# Patient Record
Sex: MALE | Race: BLACK OR AFRICAN AMERICAN | Employment: OTHER | URBAN - METROPOLITAN AREA
[De-identification: names, ages, dates, MRNs, and addresses within clinical notes are randomized per-mention and may not be internally consistent; named-entity substitution may affect disease eponyms.]

---

## 2017-04-01 DIAGNOSIS — I10 ESSENTIAL HYPERTENSION: ICD-10-CM

## 2017-04-03 RX ORDER — VALSARTAN AND HYDROCHLOROTHIAZIDE 160; 12.5 MG/1; MG/1
TABLET, FILM COATED ORAL
Qty: 90 TABLET | Refills: 1 | Status: SHIPPED | OUTPATIENT
Start: 2017-04-03 | End: 2017-08-18 | Stop reason: SDUPTHER

## 2017-05-03 ENCOUNTER — HOSPITAL ENCOUNTER (OUTPATIENT)
Age: 67
Setting detail: SPECIMEN
Discharge: HOME | End: 2017-05-03
Payer: MEDICARE

## 2017-05-03 ENCOUNTER — OFFICE VISIT (OUTPATIENT)
Dept: INTERNAL MEDICINE CLINIC | Age: 67
End: 2017-05-03
Payer: MEDICARE

## 2017-05-03 VITALS
OXYGEN SATURATION: 95 % | HEIGHT: 68 IN | HEART RATE: 85 BPM | TEMPERATURE: 98.4 F | BODY MASS INDEX: 24.55 KG/M2 | RESPIRATION RATE: 16 BRPM | SYSTOLIC BLOOD PRESSURE: 130 MMHG | DIASTOLIC BLOOD PRESSURE: 72 MMHG | WEIGHT: 162 LBS

## 2017-05-03 DIAGNOSIS — Z13.9 SCREENING: ICD-10-CM

## 2017-05-03 DIAGNOSIS — R73.03 PREDIABETES: ICD-10-CM

## 2017-05-03 DIAGNOSIS — N40.0 BENIGN NON-NODULAR PROSTATIC HYPERPLASIA WITHOUT LOWER URINARY TRACT SYMPTOMS: ICD-10-CM

## 2017-05-03 DIAGNOSIS — N52.01 ERECTILE DYSFUNCTION DUE TO ARTERIAL INSUFFICIENCY: ICD-10-CM

## 2017-05-03 DIAGNOSIS — R97.20 ELEVATED PSA: ICD-10-CM

## 2017-05-03 DIAGNOSIS — I10 ESSENTIAL HYPERTENSION: Primary | ICD-10-CM

## 2017-05-03 DIAGNOSIS — I10 ESSENTIAL HYPERTENSION: ICD-10-CM

## 2017-05-03 LAB
ALBUMIN SERPL-MCNC: 4.6 G/DL (ref 3.5–5.2)
ALBUMIN/GLOBULIN RATIO: 1.2 (ref 1–2.5)
ALP BLD-CCNC: 84 U/L (ref 40–129)
ALT SERPL-CCNC: 23 U/L (ref 5–41)
ANION GAP SERPL CALCULATED.3IONS-SCNC: 18 MMOL/L (ref 9–17)
AST SERPL-CCNC: 22 U/L
BILIRUB SERPL-MCNC: 0.24 MG/DL (ref 0.3–1.2)
BUN BLDV-MCNC: 16 MG/DL (ref 8–23)
BUN/CREAT BLD: ABNORMAL (ref 9–20)
CALCIUM SERPL-MCNC: 10.2 MG/DL (ref 8.6–10.4)
CHLORIDE BLD-SCNC: 97 MMOL/L (ref 98–107)
CHOLESTEROL/HDL RATIO: 4.1
CHOLESTEROL: 171 MG/DL
CO2: 22 MMOL/L (ref 20–31)
CREAT SERPL-MCNC: 0.9 MG/DL (ref 0.7–1.2)
GFR AFRICAN AMERICAN: >60 ML/MIN
GFR NON-AFRICAN AMERICAN: >60 ML/MIN
GFR SERPL CREATININE-BSD FRML MDRD: ABNORMAL ML/MIN/{1.73_M2}
GFR SERPL CREATININE-BSD FRML MDRD: ABNORMAL ML/MIN/{1.73_M2}
GLUCOSE BLD-MCNC: 63 MG/DL (ref 70–99)
HDLC SERPL-MCNC: 42 MG/DL
HEPATITIS C ANTIBODY: NONREACTIVE
LDL CHOLESTEROL: 100 MG/DL (ref 0–130)
POTASSIUM SERPL-SCNC: 4.7 MMOL/L (ref 3.7–5.3)
SODIUM BLD-SCNC: 137 MMOL/L (ref 135–144)
TOTAL PROTEIN: 8.4 G/DL (ref 6.4–8.3)
TRIGL SERPL-MCNC: 145 MG/DL
VLDLC SERPL CALC-MCNC: NORMAL MG/DL (ref 1–30)

## 2017-05-03 PROCEDURE — 99213 OFFICE O/P EST LOW 20 MIN: CPT | Performed by: FAMILY MEDICINE

## 2017-05-03 RX ORDER — SILDENAFIL 100 MG/1
100 TABLET, FILM COATED ORAL PRN
Qty: 30 TABLET | Refills: 3 | Status: SHIPPED | OUTPATIENT
Start: 2017-05-03 | End: 2017-05-11 | Stop reason: SDUPTHER

## 2017-05-03 ASSESSMENT — ENCOUNTER SYMPTOMS
WHEEZING: 0
ABDOMINAL PAIN: 0
FACIAL SWELLING: 0
ABDOMINAL DISTENTION: 0
TROUBLE SWALLOWING: 0
CONSTIPATION: 0
SORE THROAT: 0
ANAL BLEEDING: 0
CHEST TIGHTNESS: 0
EYE PAIN: 0
STRIDOR: 0
COUGH: 0
COLOR CHANGE: 0
EYE DISCHARGE: 0
BACK PAIN: 0
SHORTNESS OF BREATH: 0
EYE REDNESS: 0

## 2017-05-04 LAB
ESTIMATED AVERAGE GLUCOSE: 128 MG/DL
HBA1C MFR BLD: 6.1 % (ref 4–6)

## 2017-05-10 ENCOUNTER — TELEPHONE (OUTPATIENT)
Dept: INTERNAL MEDICINE CLINIC | Age: 67
End: 2017-05-10

## 2017-05-11 DIAGNOSIS — N52.01 ERECTILE DYSFUNCTION DUE TO ARTERIAL INSUFFICIENCY: ICD-10-CM

## 2017-05-11 RX ORDER — SILDENAFIL 100 MG/1
TABLET, FILM COATED ORAL
Qty: 30 TABLET | Refills: 3 | Status: SHIPPED | OUTPATIENT
Start: 2017-05-11 | End: 2017-05-20 | Stop reason: SDUPTHER

## 2017-05-20 DIAGNOSIS — N52.01 ERECTILE DYSFUNCTION DUE TO ARTERIAL INSUFFICIENCY: ICD-10-CM

## 2017-05-20 RX ORDER — SILDENAFIL 100 MG/1
TABLET, FILM COATED ORAL
Qty: 30 TABLET | Refills: 3 | Status: SHIPPED | OUTPATIENT
Start: 2017-05-20

## 2017-08-18 DIAGNOSIS — I10 ESSENTIAL HYPERTENSION: ICD-10-CM

## 2017-08-18 RX ORDER — VALSARTAN AND HYDROCHLOROTHIAZIDE 160; 12.5 MG/1; MG/1
TABLET, FILM COATED ORAL
Qty: 90 TABLET | Refills: 1 | Status: SHIPPED | OUTPATIENT
Start: 2017-08-18 | End: 2017-12-01 | Stop reason: SDUPTHER

## 2017-08-30 DIAGNOSIS — I10 ESSENTIAL HYPERTENSION: ICD-10-CM

## 2017-08-30 RX ORDER — POTASSIUM CHLORIDE 750 MG/1
TABLET, FILM COATED, EXTENDED RELEASE ORAL
Qty: 180 TABLET | Refills: 0 | Status: SHIPPED | OUTPATIENT
Start: 2017-08-30 | End: 2017-12-01 | Stop reason: ALTCHOICE

## 2017-11-02 RX ORDER — POTASSIUM CHLORIDE 750 MG/1
TABLET, EXTENDED RELEASE ORAL
Qty: 30 TABLET | Refills: 0 | OUTPATIENT
Start: 2017-11-02

## 2017-12-01 ENCOUNTER — OFFICE VISIT (OUTPATIENT)
Dept: INTERNAL MEDICINE CLINIC | Age: 67
End: 2017-12-01
Payer: MEDICARE

## 2017-12-01 VITALS
WEIGHT: 161 LBS | DIASTOLIC BLOOD PRESSURE: 80 MMHG | HEIGHT: 68 IN | HEART RATE: 82 BPM | SYSTOLIC BLOOD PRESSURE: 130 MMHG | BODY MASS INDEX: 24.4 KG/M2

## 2017-12-01 DIAGNOSIS — R73.03 PREDIABETES: ICD-10-CM

## 2017-12-01 DIAGNOSIS — Z87.898 HISTORY OF ELEVATED PSA: ICD-10-CM

## 2017-12-01 DIAGNOSIS — N52.01 ERECTILE DYSFUNCTION DUE TO ARTERIAL INSUFFICIENCY: ICD-10-CM

## 2017-12-01 DIAGNOSIS — N40.0 BENIGN NON-NODULAR PROSTATIC HYPERPLASIA WITHOUT LOWER URINARY TRACT SYMPTOMS: ICD-10-CM

## 2017-12-01 DIAGNOSIS — I10 ESSENTIAL HYPERTENSION: Primary | ICD-10-CM

## 2017-12-01 PROCEDURE — 3017F COLORECTAL CA SCREEN DOC REV: CPT | Performed by: FAMILY MEDICINE

## 2017-12-01 PROCEDURE — G8427 DOCREV CUR MEDS BY ELIG CLIN: HCPCS | Performed by: FAMILY MEDICINE

## 2017-12-01 PROCEDURE — G8420 CALC BMI NORM PARAMETERS: HCPCS | Performed by: FAMILY MEDICINE

## 2017-12-01 PROCEDURE — 4040F PNEUMOC VAC/ADMIN/RCVD: CPT | Performed by: FAMILY MEDICINE

## 2017-12-01 PROCEDURE — 1123F ACP DISCUSS/DSCN MKR DOCD: CPT | Performed by: FAMILY MEDICINE

## 2017-12-01 PROCEDURE — G8484 FLU IMMUNIZE NO ADMIN: HCPCS | Performed by: FAMILY MEDICINE

## 2017-12-01 PROCEDURE — 99214 OFFICE O/P EST MOD 30 MIN: CPT | Performed by: FAMILY MEDICINE

## 2017-12-01 PROCEDURE — 1036F TOBACCO NON-USER: CPT | Performed by: FAMILY MEDICINE

## 2017-12-01 RX ORDER — VALSARTAN AND HYDROCHLOROTHIAZIDE 160; 12.5 MG/1; MG/1
1 TABLET, FILM COATED ORAL DAILY
Qty: 90 TABLET | Refills: 1 | Status: SHIPPED | OUTPATIENT
Start: 2017-12-01 | End: 2018-05-21 | Stop reason: SDUPTHER

## 2017-12-01 RX ORDER — POTASSIUM CHLORIDE 750 MG/1
10 TABLET, FILM COATED, EXTENDED RELEASE ORAL DAILY
Qty: 180 TABLET | Refills: 0 | Status: CANCELLED | OUTPATIENT
Start: 2017-12-01

## 2017-12-01 ASSESSMENT — PATIENT HEALTH QUESTIONNAIRE - PHQ9
SUM OF ALL RESPONSES TO PHQ9 QUESTIONS 1 & 2: 0
1. LITTLE INTEREST OR PLEASURE IN DOING THINGS: 0
2. FEELING DOWN, DEPRESSED OR HOPELESS: 0
SUM OF ALL RESPONSES TO PHQ QUESTIONS 1-9: 0

## 2017-12-01 ASSESSMENT — ENCOUNTER SYMPTOMS
EYES NEGATIVE: 1
RESPIRATORY NEGATIVE: 1
ALLERGIC/IMMUNOLOGIC NEGATIVE: 1
GASTROINTESTINAL NEGATIVE: 1

## 2017-12-01 NOTE — PROGRESS NOTES
Subjective:      Patient ID: Susie Butts is a 69 y.o. male. Hypertension   This is a chronic problem. The problem has been gradually improving since onset. The problem is controlled. Risk factors for coronary artery disease include male gender. Past treatments include angiotensin blockers and diuretics. The current treatment provides moderate improvement. There are no compliance problems. Review of Systems   Constitutional: Negative. HENT: Negative. Eyes: Negative. Respiratory: Negative. Cardiovascular: Negative. Gastrointestinal: Negative. Endocrine: Negative. Musculoskeletal: Negative. Skin: Negative. Allergic/Immunologic: Negative. Neurological: Negative. Hematological: Negative. Psychiatric/Behavioral: Negative. Past family and social history unremarkable. Objective:   Physical Exam   Constitutional: He is oriented to person, place, and time. He appears well-developed and well-nourished. HENT:   Head: Normocephalic and atraumatic. Right Ear: External ear normal.   Left Ear: External ear normal.   Nose: Nose normal.   Mouth/Throat: Oropharynx is clear and moist.   Eyes: Conjunctivae and EOM are normal. Pupils are equal, round, and reactive to light. Neck: Normal range of motion. Neck supple. Cardiovascular: Normal rate, regular rhythm, normal heart sounds and intact distal pulses. Pulmonary/Chest: Effort normal and breath sounds normal.   Abdominal: Soft. Bowel sounds are normal.   Musculoskeletal: Normal range of motion. Neurological: He is alert and oriented to person, place, and time. He has normal reflexes. Skin: Skin is warm and dry. Psychiatric: He has a normal mood and affect. His behavior is normal. Thought content normal.       Assessment:      1. Essential hypertension  valsartan-hydrochlorothiazide (DIOVAN-HCT) 160-12.5 MG per tablet   2. Prediabetes     3. Erectile dysfunction due to arterial insufficiency     4.  Benign non-nodular prostatic hyperplasia without lower urinary tract symptoms     5. History of elevated PSA             Plan:      77-year-old male who is presented for follow-up. He is afebrile hemodynamically stable, clinical examination is benign. History of hypertension well-controlled to ARB and diuretic that he is tolerating well. Advised to continue current regimen, low-fat high-fiber diet, consume less than 2 g of salt a day, daily moderate exercise  Prediabetes with A1c of 6.1. Risk factor stratification is advised. History of BPH. clinically asymptomatic  He is updated on colonoscopy he is updated on colonoscopy done in 2016. Past  history of elevated PSA for which she has been evaluated by urology. Erectile dysfunction baseline stable. He denies tobacco, excessive alcohol or illicit drug use  Recent past labs reviewed, discussed with patient, question answered    This note is created with a voice recognition program and while intend to generate a document that accurately reflects the content of the visit, no guarantee can be provided that every mistake has been identified and corrected by editing.

## 2018-06-01 ENCOUNTER — HOSPITAL ENCOUNTER (OUTPATIENT)
Age: 68
Setting detail: SPECIMEN
Discharge: HOME | End: 2018-06-01
Payer: MEDICARE

## 2018-06-01 ENCOUNTER — OFFICE VISIT (OUTPATIENT)
Dept: INTERNAL MEDICINE CLINIC | Age: 68
End: 2018-06-01
Payer: MEDICARE

## 2018-06-01 VITALS
HEIGHT: 68 IN | SYSTOLIC BLOOD PRESSURE: 118 MMHG | WEIGHT: 161.2 LBS | BODY MASS INDEX: 24.43 KG/M2 | DIASTOLIC BLOOD PRESSURE: 78 MMHG

## 2018-06-01 DIAGNOSIS — I10 ESSENTIAL HYPERTENSION: ICD-10-CM

## 2018-06-01 DIAGNOSIS — D12.6 ADENOMATOUS POLYP OF COLON, UNSPECIFIED PART OF COLON: ICD-10-CM

## 2018-06-01 DIAGNOSIS — Z87.898 HISTORY OF ELEVATED PSA: ICD-10-CM

## 2018-06-01 DIAGNOSIS — R73.03 PREDIABETES: ICD-10-CM

## 2018-06-01 DIAGNOSIS — N40.0 BENIGN NON-NODULAR PROSTATIC HYPERPLASIA WITHOUT LOWER URINARY TRACT SYMPTOMS: ICD-10-CM

## 2018-06-01 DIAGNOSIS — N52.01 ERECTILE DYSFUNCTION DUE TO ARTERIAL INSUFFICIENCY: ICD-10-CM

## 2018-06-01 DIAGNOSIS — R73.03 PREDIABETES: Primary | ICD-10-CM

## 2018-06-01 LAB
ABSOLUTE EOS #: 0.13 K/UL (ref 0–0.44)
ABSOLUTE IMMATURE GRANULOCYTE: <0.03 K/UL (ref 0–0.3)
ABSOLUTE LYMPH #: 1.62 K/UL (ref 1.1–3.7)
ABSOLUTE MONO #: 0.45 K/UL (ref 0.1–1.2)
ALBUMIN SERPL-MCNC: 4.1 G/DL (ref 3.5–5.2)
ALBUMIN/GLOBULIN RATIO: 1.2 (ref 1–2.5)
ALP BLD-CCNC: 77 U/L (ref 40–129)
ALT SERPL-CCNC: 36 U/L (ref 5–41)
ANION GAP SERPL CALCULATED.3IONS-SCNC: 16 MMOL/L (ref 9–17)
AST SERPL-CCNC: 25 U/L
BASOPHILS # BLD: 0 % (ref 0–2)
BASOPHILS ABSOLUTE: <0.03 K/UL (ref 0–0.2)
BILIRUB SERPL-MCNC: 0.35 MG/DL (ref 0.3–1.2)
BILIRUBIN URINE: NEGATIVE
BUN BLDV-MCNC: 16 MG/DL (ref 8–23)
BUN/CREAT BLD: NORMAL (ref 9–20)
CALCIUM SERPL-MCNC: 8.9 MG/DL (ref 8.6–10.4)
CHLORIDE BLD-SCNC: 99 MMOL/L (ref 98–107)
CHOLESTEROL/HDL RATIO: 5.1
CHOLESTEROL: 157 MG/DL
CO2: 20 MMOL/L (ref 20–31)
COLOR: YELLOW
COMMENT UA: NORMAL
CREAT SERPL-MCNC: 1.04 MG/DL (ref 0.7–1.2)
CREATININE URINE: 105 MG/DL (ref 39–259)
DIFFERENTIAL TYPE: ABNORMAL
EOSINOPHILS RELATIVE PERCENT: 3 % (ref 1–4)
GFR AFRICAN AMERICAN: >60 ML/MIN
GFR NON-AFRICAN AMERICAN: >60 ML/MIN
GFR SERPL CREATININE-BSD FRML MDRD: NORMAL ML/MIN/{1.73_M2}
GFR SERPL CREATININE-BSD FRML MDRD: NORMAL ML/MIN/{1.73_M2}
GLUCOSE BLD-MCNC: 96 MG/DL (ref 70–99)
GLUCOSE URINE: NEGATIVE
HCT VFR BLD CALC: 48.5 % (ref 40.7–50.3)
HDLC SERPL-MCNC: 31 MG/DL
HEMOGLOBIN: 15.7 G/DL (ref 13–17)
HEPATITIS C ANTIBODY: NONREACTIVE
HIV AG/AB: NONREACTIVE
IMMATURE GRANULOCYTES: 1 %
KETONES, URINE: NEGATIVE
LDL CHOLESTEROL: 87 MG/DL (ref 0–130)
LEUKOCYTE ESTERASE, URINE: NEGATIVE
LYMPHOCYTES # BLD: 42 % (ref 24–43)
MCH RBC QN AUTO: 28.6 PG (ref 25.2–33.5)
MCHC RBC AUTO-ENTMCNC: 32.4 G/DL (ref 28.4–34.8)
MCV RBC AUTO: 88.3 FL (ref 82.6–102.9)
MICROALBUMIN/CREAT 24H UR: <12 MG/L
MICROALBUMIN/CREAT UR-RTO: NORMAL MCG/MG CREAT
MONOCYTES # BLD: 12 % (ref 3–12)
NITRITE, URINE: NEGATIVE
NRBC AUTOMATED: 0 PER 100 WBC
PDW BLD-RTO: 14.8 % (ref 11.8–14.4)
PH UA: 5 (ref 5–8)
PLATELET # BLD: 166 K/UL (ref 138–453)
PLATELET ESTIMATE: ABNORMAL
PMV BLD AUTO: 11.6 FL (ref 8.1–13.5)
POTASSIUM SERPL-SCNC: 4 MMOL/L (ref 3.7–5.3)
PROSTATE SPECIFIC ANTIGEN: 7.4 UG/L
PROTEIN UA: NEGATIVE
RBC # BLD: 5.49 M/UL (ref 4.21–5.77)
RBC # BLD: ABNORMAL 10*6/UL
SEG NEUTROPHILS: 42 % (ref 36–65)
SEGMENTED NEUTROPHILS ABSOLUTE COUNT: 1.62 K/UL (ref 1.5–8.1)
SODIUM BLD-SCNC: 135 MMOL/L (ref 135–144)
SPECIFIC GRAVITY UA: 1.02 (ref 1–1.03)
TOTAL PROTEIN: 7.5 G/DL (ref 6.4–8.3)
TRIGL SERPL-MCNC: 195 MG/DL
TSH SERPL DL<=0.05 MIU/L-ACNC: 2.01 MIU/L (ref 0.3–5)
TURBIDITY: CLEAR
URINE HGB: NEGATIVE
UROBILINOGEN, URINE: NORMAL
VLDLC SERPL CALC-MCNC: ABNORMAL MG/DL (ref 1–30)
WBC # BLD: 3.9 K/UL (ref 3.5–11.3)
WBC # BLD: ABNORMAL 10*3/UL

## 2018-06-01 PROCEDURE — 99214 OFFICE O/P EST MOD 30 MIN: CPT | Performed by: FAMILY MEDICINE

## 2018-06-01 PROCEDURE — 4040F PNEUMOC VAC/ADMIN/RCVD: CPT | Performed by: FAMILY MEDICINE

## 2018-06-01 PROCEDURE — 3017F COLORECTAL CA SCREEN DOC REV: CPT | Performed by: FAMILY MEDICINE

## 2018-06-01 PROCEDURE — G8420 CALC BMI NORM PARAMETERS: HCPCS | Performed by: FAMILY MEDICINE

## 2018-06-01 PROCEDURE — G8427 DOCREV CUR MEDS BY ELIG CLIN: HCPCS | Performed by: FAMILY MEDICINE

## 2018-06-01 PROCEDURE — 1123F ACP DISCUSS/DSCN MKR DOCD: CPT | Performed by: FAMILY MEDICINE

## 2018-06-01 PROCEDURE — 1036F TOBACCO NON-USER: CPT | Performed by: FAMILY MEDICINE

## 2018-06-01 RX ORDER — VALSARTAN AND HYDROCHLOROTHIAZIDE 160; 12.5 MG/1; MG/1
1 TABLET, FILM COATED ORAL DAILY
Qty: 90 TABLET | Refills: 1 | Status: SHIPPED | OUTPATIENT
Start: 2018-06-01 | End: 2020-03-18 | Stop reason: SDUPTHER

## 2018-06-01 ASSESSMENT — ENCOUNTER SYMPTOMS
ALLERGIC/IMMUNOLOGIC NEGATIVE: 1
EYES NEGATIVE: 1
GASTROINTESTINAL NEGATIVE: 1
RESPIRATORY NEGATIVE: 1

## 2018-06-03 LAB
ESTIMATED AVERAGE GLUCOSE: 131 MG/DL
HBA1C MFR BLD: 6.2 % (ref 4–6)

## 2020-03-19 RX ORDER — VALSARTAN AND HYDROCHLOROTHIAZIDE 160; 12.5 MG/1; MG/1
1 TABLET, FILM COATED ORAL DAILY
Qty: 30 TABLET | Refills: 0 | Status: SHIPPED | OUTPATIENT
Start: 2020-03-19 | End: 2020-04-24 | Stop reason: SDUPTHER

## 2020-04-24 ENCOUNTER — VIRTUAL VISIT (OUTPATIENT)
Dept: INTERNAL MEDICINE CLINIC | Age: 70
End: 2020-04-24
Payer: MEDICARE

## 2020-04-24 PROBLEM — E78.5 DYSLIPIDEMIA: Status: ACTIVE | Noted: 2020-04-24

## 2020-04-24 PROCEDURE — 99443 PR PHYS/QHP TELEPHONE EVALUATION 21-30 MIN: CPT | Performed by: FAMILY MEDICINE

## 2020-04-24 RX ORDER — VALSARTAN AND HYDROCHLOROTHIAZIDE 160; 12.5 MG/1; MG/1
1 TABLET, FILM COATED ORAL DAILY
Qty: 90 TABLET | Refills: 0 | Status: SHIPPED | OUTPATIENT
Start: 2020-04-24 | End: 2020-05-05 | Stop reason: SDUPTHER

## 2020-04-24 ASSESSMENT — ENCOUNTER SYMPTOMS
ALLERGIC/IMMUNOLOGIC NEGATIVE: 1
RESPIRATORY NEGATIVE: 1
GASTROINTESTINAL NEGATIVE: 1
EYES NEGATIVE: 1

## 2020-04-24 NOTE — PROGRESS NOTES
Subjective:      Patient ID: Sharon Freeman is a 65 y.o. male. Hypertension   This is a chronic problem. The current episode started more than 1 month ago. The problem is unchanged. The problem is controlled. Associated symptoms include anxiety. Risk factors for coronary artery disease include sedentary lifestyle, male gender and dyslipidemia. Past treatments include angiotensin blockers and diuretics. The current treatment provides moderate improvement. There are no compliance problems. Review of Systems   Constitutional: Negative. HENT: Negative. Eyes: Negative. Respiratory: Negative. Cardiovascular: Negative. Gastrointestinal: Negative. Endocrine: Negative. Musculoskeletal: Negative. Skin: Negative. Allergic/Immunologic: Negative. Neurological: Negative. Hematological: Negative. Psychiatric/Behavioral: Negative. Past family and social history unremarkable. Diagnosis Orders   1. Essential hypertension  valsartan-hydrochlorothiazide (DIOVAN-HCT) 160-12.5 MG per tablet   2. Erectile dysfunction due to arterial insufficiency     3. Adenomatous polyp of colon, unspecified part of colon     4. Benign non-nodular prostatic hyperplasia without lower urinary tract symptoms     5. History of colon polyps     6. Prediabetes     7. Elevated PSA     8. Dyslipidemia           Objective:   Physical Exam  Vitals signs and nursing note reviewed. Constitutional:       Appearance: He is well-developed. HENT:      Head: Normocephalic and atraumatic. Right Ear: External ear normal.      Left Ear: External ear normal.      Nose: Nose normal.   Eyes:      Conjunctiva/sclera: Conjunctivae normal.      Pupils: Pupils are equal, round, and reactive to light. Neck:      Musculoskeletal: Normal range of motion and neck supple. Cardiovascular:      Rate and Rhythm: Normal rate and regular rhythm. Heart sounds: Normal heart sounds.       Comments:

## 2020-04-28 RX ORDER — VALSARTAN AND HYDROCHLOROTHIAZIDE 160; 12.5 MG/1; MG/1
1 TABLET, FILM COATED ORAL DAILY
Qty: 30 TABLET | Refills: 0 | OUTPATIENT
Start: 2020-04-28

## 2020-05-05 RX ORDER — VALSARTAN AND HYDROCHLOROTHIAZIDE 160; 12.5 MG/1; MG/1
1 TABLET, FILM COATED ORAL DAILY
Qty: 90 TABLET | Refills: 0 | Status: SHIPPED | OUTPATIENT
Start: 2020-05-05 | End: 2020-05-06

## 2020-05-05 NOTE — TELEPHONE ENCOUNTER
Last filled 4/24/2020 [facility]. Pt is now using [facility]. Last seen for VV 4/24/2020    Next Visit Date:  No future appointments. Health Maintenance   Topic Date Due    DTaP/Tdap/Td vaccine (1 - Tdap) 03/30/1967    Shingles Vaccine (1 of 2) 03/30/1998    Pneumococcal 65+ years Vaccine (1 of 1 - PPSV23) 03/30/2013    A1C test (Diabetic or Prediabetic)  06/01/2019    Potassium monitoring  06/01/2019    Creatinine monitoring  06/01/2019    PSA counseling  06/01/2019    Annual Wellness Visit (AWV)  06/18/2019    Colon cancer screen colonoscopy  07/15/2019    Flu vaccine (Season Ended) 09/01/2020    Lipid screen  06/01/2023    Hepatitis C screen  Completed    Hepatitis A vaccine  Aged Out    Hepatitis B vaccine  Aged Out    Hib vaccine  Aged Out    Meningococcal (ACWY) vaccine  Aged Out       Hemoglobin A1C (%)   Date Value   06/01/2018 6.2 (H)   05/03/2017 6.1 (H)   11/02/2016 6.0             ( goal A1C is < 7)   Microalb/Crt.  Ratio (mcg/mg creat)   Date Value   06/01/2018 CANNOT BE CALCULATED     LDL Cholesterol (mg/dL)   Date Value   06/01/2018 87   05/03/2017 100       (goal LDL is <100)   AST (U/L)   Date Value   06/01/2018 25     ALT (U/L)   Date Value   06/01/2018 36     BUN (mg/dL)   Date Value   06/01/2018 16     BP Readings from Last 3 Encounters:   06/01/18 118/78   12/01/17 130/80   05/03/17 130/72          (goal 120/80)    All Future Testing planned in [facility]  Lab Frequency Next Occurrence               Patient Active Problem List:     Essential hypertension     Elevated PSA     Erectile dysfunction due to arterial insufficiency     Benign non-nodular prostatic hyperplasia without lower urinary tract symptoms     Adenomatous colon polyp     History of colon polyps     Prediabetes     Dyslipidemia

## 2020-05-06 RX ORDER — VALSARTAN AND HYDROCHLOROTHIAZIDE 160; 12.5 MG/1; MG/1
1 TABLET, FILM COATED ORAL DAILY
Qty: 90 TABLET | Refills: 0 | Status: SHIPPED | OUTPATIENT
Start: 2020-05-06 | End: 2020-06-17

## 2020-06-17 RX ORDER — VALSARTAN AND HYDROCHLOROTHIAZIDE 160; 12.5 MG/1; MG/1
TABLET, FILM COATED ORAL
Qty: 90 TABLET | Refills: 0 | Status: SHIPPED | OUTPATIENT
Start: 2020-06-17 | End: 2020-08-24

## 2020-08-24 RX ORDER — VALSARTAN AND HYDROCHLOROTHIAZIDE 160; 12.5 MG/1; MG/1
TABLET, FILM COATED ORAL
Qty: 30 TABLET | Refills: 0 | Status: SHIPPED | OUTPATIENT
Start: 2020-08-24 | End: 2020-10-01

## 2020-08-24 NOTE — TELEPHONE ENCOUNTER
Audrey Canales is calling to request a refill on the following medication(s):    Medication Request:  Requested Prescriptions     Pending Prescriptions Disp Refills    valsartan-hydrochlorothiazide (DIOVAN-HCT) 160-12.5 MG per tablet [Pharmacy Med Name: VALSARTAN/HYDROCHLOROTHIAZIDE 160-12.5 MG Tablet] 90 tablet 0     Sig: TAKE 1 TABLET EVERY DAY       Last Visit Date (If Applicable):  8/81/6900    Next Visit Date:    Visit date not found

## 2020-10-01 RX ORDER — VALSARTAN AND HYDROCHLOROTHIAZIDE 160; 12.5 MG/1; MG/1
TABLET, FILM COATED ORAL
Qty: 30 TABLET | Refills: 0 | Status: SHIPPED | OUTPATIENT
Start: 2020-10-01 | End: 2021-12-09 | Stop reason: SDUPTHER

## 2020-10-01 NOTE — TELEPHONE ENCOUNTER
Raghav Ervin is calling to request a refill on the following medication(s):    Medication Request:    Last filled 8/24/2020 #30 with 0 RF    Requested Prescriptions     Pending Prescriptions Disp Refills    valsartan-hydrochlorothiazide (DIOVAN-HCT) 160-12.5 MG per tablet [Pharmacy Med Name: VALSARTAN/HYDROCHLOROTHIAZIDE 160-12.5 MG Tablet] 30 tablet 0     Sig: TAKE 1 TABLET EVERY DAY       Last Visit Date (If Applicable):  2/19/9767    Next Visit Date:    Visit date not found

## 2020-10-23 RX ORDER — VALSARTAN AND HYDROCHLOROTHIAZIDE 160; 12.5 MG/1; MG/1
TABLET, FILM COATED ORAL
Qty: 30 TABLET | Refills: 0 | OUTPATIENT
Start: 2020-10-23

## 2020-10-23 NOTE — TELEPHONE ENCOUNTER
Tamie Hernandez is calling to request a refill on the following medication(s):    Medication Request:  Requested Prescriptions     Pending Prescriptions Disp Refills    valsartan-hydrochlorothiazide (DIOVAN-HCT) 160-12.5 MG per tablet [Pharmacy Med Name: VALSARTAN/HYDROCHLOROTHIAZIDE 160-12.5 MG Tablet] 30 tablet 0     Sig: TAKE 1 TABLET EVERY DAY (NEED MD APPOINTMENT)     Last filled 10/1/20    Last Visit Date (If Applicable):  6/95/2307    Next Visit Date:    Visit date not found

## 2021-12-09 DIAGNOSIS — I10 ESSENTIAL HYPERTENSION: ICD-10-CM

## 2021-12-09 RX ORDER — VALSARTAN AND HYDROCHLOROTHIAZIDE 160; 12.5 MG/1; MG/1
TABLET, FILM COATED ORAL
Qty: 30 TABLET | Refills: 0 | Status: SHIPPED | OUTPATIENT
Start: 2021-12-09 | End: 2021-12-14 | Stop reason: SDUPTHER

## 2021-12-09 NOTE — TELEPHONE ENCOUNTER
Benton Vera is calling to request a refill on the following medication(s):    Last Visit Date (If Applicable):  3/10/6411    Next Visit Date:    Visit date not found    Medication Request:  Requested Prescriptions      No prescriptions requested or ordered in this encounter

## 2021-12-14 ENCOUNTER — OFFICE VISIT (OUTPATIENT)
Dept: INTERNAL MEDICINE CLINIC | Age: 71
End: 2021-12-14
Payer: MEDICARE

## 2021-12-14 VITALS
RESPIRATION RATE: 20 BRPM | OXYGEN SATURATION: 98 % | HEIGHT: 68 IN | WEIGHT: 158 LBS | SYSTOLIC BLOOD PRESSURE: 132 MMHG | TEMPERATURE: 97.2 F | BODY MASS INDEX: 23.95 KG/M2 | DIASTOLIC BLOOD PRESSURE: 82 MMHG | HEART RATE: 65 BPM

## 2021-12-14 DIAGNOSIS — D12.6 ADENOMATOUS POLYP OF COLON, UNSPECIFIED PART OF COLON: ICD-10-CM

## 2021-12-14 DIAGNOSIS — I10 ESSENTIAL HYPERTENSION: Primary | ICD-10-CM

## 2021-12-14 DIAGNOSIS — N52.01 ERECTILE DYSFUNCTION DUE TO ARTERIAL INSUFFICIENCY: ICD-10-CM

## 2021-12-14 DIAGNOSIS — R73.03 PREDIABETES: ICD-10-CM

## 2021-12-14 DIAGNOSIS — Z78.9 DAILY CONSUMPTION OF ALCOHOL: ICD-10-CM

## 2021-12-14 DIAGNOSIS — Z91.199 NONCOMPLIANCE: ICD-10-CM

## 2021-12-14 DIAGNOSIS — N40.0 BENIGN NON-NODULAR PROSTATIC HYPERPLASIA WITHOUT LOWER URINARY TRACT SYMPTOMS: ICD-10-CM

## 2021-12-14 DIAGNOSIS — Z86.010 HISTORY OF COLON POLYPS: ICD-10-CM

## 2021-12-14 DIAGNOSIS — E78.5 DYSLIPIDEMIA: ICD-10-CM

## 2021-12-14 PROCEDURE — 1123F ACP DISCUSS/DSCN MKR DOCD: CPT | Performed by: FAMILY MEDICINE

## 2021-12-14 PROCEDURE — 99214 OFFICE O/P EST MOD 30 MIN: CPT | Performed by: FAMILY MEDICINE

## 2021-12-14 PROCEDURE — 1036F TOBACCO NON-USER: CPT | Performed by: FAMILY MEDICINE

## 2021-12-14 PROCEDURE — G8420 CALC BMI NORM PARAMETERS: HCPCS | Performed by: FAMILY MEDICINE

## 2021-12-14 PROCEDURE — G8484 FLU IMMUNIZE NO ADMIN: HCPCS | Performed by: FAMILY MEDICINE

## 2021-12-14 PROCEDURE — G8427 DOCREV CUR MEDS BY ELIG CLIN: HCPCS | Performed by: FAMILY MEDICINE

## 2021-12-14 PROCEDURE — 4040F PNEUMOC VAC/ADMIN/RCVD: CPT | Performed by: FAMILY MEDICINE

## 2021-12-14 PROCEDURE — 3017F COLORECTAL CA SCREEN DOC REV: CPT | Performed by: FAMILY MEDICINE

## 2021-12-14 RX ORDER — VALSARTAN AND HYDROCHLOROTHIAZIDE 160; 12.5 MG/1; MG/1
TABLET, FILM COATED ORAL
Qty: 45 TABLET | Refills: 0 | Status: SHIPPED | OUTPATIENT
Start: 2021-12-14 | End: 2022-02-07

## 2021-12-14 RX ORDER — VALSARTAN AND HYDROCHLOROTHIAZIDE 160; 12.5 MG/1; MG/1
TABLET, FILM COATED ORAL
Qty: 90 TABLET | Refills: 1 | Status: SHIPPED | OUTPATIENT
Start: 2021-12-14 | End: 2021-12-14 | Stop reason: CLARIF

## 2021-12-14 SDOH — ECONOMIC STABILITY: FOOD INSECURITY: WITHIN THE PAST 12 MONTHS, THE FOOD YOU BOUGHT JUST DIDN'T LAST AND YOU DIDN'T HAVE MONEY TO GET MORE.: NEVER TRUE

## 2021-12-14 SDOH — ECONOMIC STABILITY: FOOD INSECURITY: WITHIN THE PAST 12 MONTHS, YOU WORRIED THAT YOUR FOOD WOULD RUN OUT BEFORE YOU GOT MONEY TO BUY MORE.: NEVER TRUE

## 2021-12-14 ASSESSMENT — PATIENT HEALTH QUESTIONNAIRE - PHQ9
SUM OF ALL RESPONSES TO PHQ9 QUESTIONS 1 & 2: 0
SUM OF ALL RESPONSES TO PHQ QUESTIONS 1-9: 0
1. LITTLE INTEREST OR PLEASURE IN DOING THINGS: 0
2. FEELING DOWN, DEPRESSED OR HOPELESS: 0
SUM OF ALL RESPONSES TO PHQ QUESTIONS 1-9: 0
SUM OF ALL RESPONSES TO PHQ QUESTIONS 1-9: 0

## 2021-12-14 ASSESSMENT — ENCOUNTER SYMPTOMS
RESPIRATORY NEGATIVE: 1
ALLERGIC/IMMUNOLOGIC NEGATIVE: 1
GASTROINTESTINAL NEGATIVE: 1
EYES NEGATIVE: 1

## 2021-12-14 ASSESSMENT — SOCIAL DETERMINANTS OF HEALTH (SDOH): HOW HARD IS IT FOR YOU TO PAY FOR THE VERY BASICS LIKE FOOD, HOUSING, MEDICAL CARE, AND HEATING?: NOT HARD AT ALL

## 2021-12-14 NOTE — PROGRESS NOTES
Subjective:      Patient ID: Cheyenne Saldana is a 66 y.o. male. Hypertension  This is a chronic problem. The current episode started more than 1 month ago. The problem is unchanged. The problem is controlled. Associated symptoms include anxiety. Risk factors for coronary artery disease include stress and male gender (Impaired fasting glucose). Past treatments include angiotensin blockers and diuretics. The current treatment provides moderate improvement. Compliance problems include psychosocial issues. Review of Systems   Constitutional: Negative. HENT: Negative. Eyes: Negative. Respiratory: Negative. Cardiovascular: Negative. Gastrointestinal: Negative. Endocrine: Negative. Musculoskeletal: Negative. Skin: Negative. Allergic/Immunologic: Negative. Neurological: Negative. Hematological: Negative. Psychiatric/Behavioral: Positive for decreased concentration and dysphoric mood. The patient is hyperactive. Past family and social history unremarkable. Diagnosis Orders   1. Essential hypertension  valsartan-hydroCHLOROthiazide (DIOVAN-HCT) 160-12.5 MG per tablet    DISCONTINUED: valsartan-hydroCHLOROthiazide (DIOVAN-HCT) 160-12.5 MG per tablet   2. Erectile dysfunction due to arterial insufficiency     3. Benign non-nodular prostatic hyperplasia without lower urinary tract symptoms     4. Adenomatous polyp of colon, unspecified part of colon     5. History of colon polyps     6. Prediabetes     7. Dyslipidemia     8. Daily consumption of alcohol     9. Noncompliance           Objective:   Physical Exam  Vitals and nursing note reviewed. Constitutional:       Appearance: He is well-developed. HENT:      Head: Normocephalic and atraumatic. Right Ear: External ear normal.      Left Ear: External ear normal.      Nose: Nose normal.   Eyes:      Conjunctiva/sclera: Conjunctivae normal.      Pupils: Pupils are equal, round, and reactive to light.    Cardiovascular: Rate and Rhythm: Normal rate and regular rhythm. Heart sounds: Normal heart sounds. Comments: Hypertension  Impaired fasting glucose  Dyslipidemia  Pulmonary:      Effort: Pulmonary effort is normal.      Breath sounds: Normal breath sounds. Abdominal:      General: Bowel sounds are normal.      Palpations: Abdomen is soft. Comments: Colon polyp   Genitourinary:     Comments: Erectile dysfunction  Musculoskeletal:         General: Normal range of motion. Cervical back: Normal range of motion and neck supple. Comments: Degenerative polyarthralgia   Skin:     General: Skin is warm and dry. Neurological:      Mental Status: He is alert and oriented to person, place, and time. Deep Tendon Reflexes: Reflexes are normal and symmetric. Psychiatric:      Comments: Overconsumption of daily alcohol  Anxiety with decreased concentration         Assessment:       Diagnosis Orders   1. Essential hypertension  valsartan-hydroCHLOROthiazide (DIOVAN-HCT) 160-12.5 MG per tablet    DISCONTINUED: valsartan-hydroCHLOROthiazide (DIOVAN-HCT) 160-12.5 MG per tablet   2. Erectile dysfunction due to arterial insufficiency     3. Benign non-nodular prostatic hyperplasia without lower urinary tract symptoms     4. Adenomatous polyp of colon, unspecified part of colon     5. History of colon polyps     6. Prediabetes     7. Dyslipidemia     8. Daily consumption of alcohol     9. Noncompliance             Plan:      56-year-old -American male who was last seen in 4/2020 is presented to reestablish. Patient states that because of scare of Covid problems, he does not come out of his house. I offered labs however, he does not appear inclined. Hypertension. Continue Diovan HCT that he has been tolerating well. He is advised to continue current regimen, low-fat high-fiber diet, daily moderate exercise, stress reduction and consumption of less than 2 g of salt a day  Daily alcohol consumption. However he would not tell me how much alcohol he consumes. He is advised to limit alcohol intake to 1 drink a day. I have ordered serum alcohol level and magnesium. He is advised to consider alcohol Anonymous. Impaired fasting glucose with A1c of 6. He would benefit from low-fat high-fiber diet, refraining from alcohol abuse  History of colon polyps status post colonoscopy and polypectomy. Dyslipidemia being nonpharmacologically treated  Erectile dysfunction. Emphasized importance of staying sober. He denies any urinary retention  Patient states that he is planning to go out of town. He will call me back when he is ready to get his labs done. He understand that I would not be able to establish the date of his care without having access to his labs. He is advised to update his influenza and Covid vaccination  He denies tobacco or illicit drug use  Call for any concern  Medication refill provided  Follow-up in next 4 to 6 weeks  This note is created with a voice recognition program and while intend to generate a document that accurately reflects the content of the visit, no guarantee can be provided that every mistake has been identified and corrected by editing.           Franklyn Andino MD

## 2021-12-15 ENCOUNTER — HOSPITAL ENCOUNTER (OUTPATIENT)
Age: 71
Setting detail: SPECIMEN
Discharge: HOME | End: 2021-12-15

## 2021-12-15 LAB
ALBUMIN SERPL-MCNC: 4.4 G/DL (ref 3.5–5.2)
ALBUMIN/GLOBULIN RATIO: 1.3 (ref 1–2.5)
ALP BLD-CCNC: 80 U/L (ref 40–129)
ALT SERPL-CCNC: 23 U/L (ref 5–41)
ANION GAP SERPL CALCULATED.3IONS-SCNC: 16 MMOL/L (ref 9–17)
AST SERPL-CCNC: 20 U/L
BILIRUB SERPL-MCNC: 0.23 MG/DL (ref 0.3–1.2)
BUN BLDV-MCNC: 17 MG/DL (ref 8–23)
BUN/CREAT BLD: ABNORMAL (ref 9–20)
CALCIUM SERPL-MCNC: 9.7 MG/DL (ref 8.6–10.4)
CHLORIDE BLD-SCNC: 100 MMOL/L (ref 98–107)
CHOLESTEROL, FASTING: 159 MG/DL
CHOLESTEROL/HDL RATIO: 4.4
CO2: 21 MMOL/L (ref 20–31)
CREAT SERPL-MCNC: 1.13 MG/DL (ref 0.7–1.2)
ESTIMATED AVERAGE GLUCOSE: 137 MG/DL
GFR AFRICAN AMERICAN: >60 ML/MIN
GFR NON-AFRICAN AMERICAN: >60 ML/MIN
GFR SERPL CREATININE-BSD FRML MDRD: ABNORMAL ML/MIN/{1.73_M2}
GFR SERPL CREATININE-BSD FRML MDRD: ABNORMAL ML/MIN/{1.73_M2}
GLUCOSE FASTING: 91 MG/DL (ref 70–99)
HBA1C MFR BLD: 6.4 % (ref 4–6)
HCT VFR BLD CALC: 50.1 % (ref 40.7–50.3)
HDLC SERPL-MCNC: 36 MG/DL
HEMOGLOBIN: 16.5 G/DL (ref 13–17)
LDL CHOLESTEROL: 101 MG/DL (ref 0–130)
MCH RBC QN AUTO: 29.7 PG (ref 25.2–33.5)
MCHC RBC AUTO-ENTMCNC: 32.9 G/DL (ref 28.4–34.8)
MCV RBC AUTO: 90.1 FL (ref 82.6–102.9)
NRBC AUTOMATED: 0 PER 100 WBC
PDW BLD-RTO: 14.6 % (ref 11.8–14.4)
PLATELET # BLD: 169 K/UL (ref 138–453)
PMV BLD AUTO: 10.8 FL (ref 8.1–13.5)
POTASSIUM SERPL-SCNC: 4.4 MMOL/L (ref 3.7–5.3)
PROSTATE SPECIFIC ANTIGEN: 11.38 UG/L
RBC # BLD: 5.56 M/UL (ref 4.21–5.77)
SODIUM BLD-SCNC: 137 MMOL/L (ref 135–144)
TOTAL PROTEIN: 7.8 G/DL (ref 6.4–8.3)
TRIGLYCERIDE, FASTING: 111 MG/DL
TSH SERPL DL<=0.05 MIU/L-ACNC: 1.98 MIU/L (ref 0.3–5)
VLDLC SERPL CALC-MCNC: ABNORMAL MG/DL (ref 1–30)
WBC # BLD: 3.9 K/UL (ref 3.5–11.3)

## 2022-01-14 ENCOUNTER — OFFICE VISIT (OUTPATIENT)
Dept: INTERNAL MEDICINE CLINIC | Age: 72
End: 2022-01-14
Payer: MEDICARE

## 2022-01-14 VITALS
HEART RATE: 56 BPM | OXYGEN SATURATION: 96 % | WEIGHT: 163 LBS | HEIGHT: 68 IN | TEMPERATURE: 97.1 F | DIASTOLIC BLOOD PRESSURE: 88 MMHG | BODY MASS INDEX: 24.71 KG/M2 | SYSTOLIC BLOOD PRESSURE: 138 MMHG | RESPIRATION RATE: 16 BRPM

## 2022-01-14 DIAGNOSIS — N28.9 RENAL INSUFFICIENCY: ICD-10-CM

## 2022-01-14 DIAGNOSIS — N52.01 ERECTILE DYSFUNCTION DUE TO ARTERIAL INSUFFICIENCY: ICD-10-CM

## 2022-01-14 DIAGNOSIS — N40.0 BENIGN NON-NODULAR PROSTATIC HYPERPLASIA WITHOUT LOWER URINARY TRACT SYMPTOMS: ICD-10-CM

## 2022-01-14 DIAGNOSIS — D12.6 ADENOMATOUS POLYP OF COLON, UNSPECIFIED PART OF COLON: ICD-10-CM

## 2022-01-14 DIAGNOSIS — I10 ESSENTIAL HYPERTENSION: Primary | ICD-10-CM

## 2022-01-14 DIAGNOSIS — R73.03 PREDIABETES: ICD-10-CM

## 2022-01-14 DIAGNOSIS — Z78.9 DAILY CONSUMPTION OF ALCOHOL: ICD-10-CM

## 2022-01-14 DIAGNOSIS — E78.5 DYSLIPIDEMIA: ICD-10-CM

## 2022-01-14 PROBLEM — Z91.199 NONCOMPLIANCE: Status: RESOLVED | Noted: 2021-12-14 | Resolved: 2022-01-14

## 2022-01-14 PROCEDURE — 3017F COLORECTAL CA SCREEN DOC REV: CPT | Performed by: FAMILY MEDICINE

## 2022-01-14 PROCEDURE — G8427 DOCREV CUR MEDS BY ELIG CLIN: HCPCS | Performed by: FAMILY MEDICINE

## 2022-01-14 PROCEDURE — 1036F TOBACCO NON-USER: CPT | Performed by: FAMILY MEDICINE

## 2022-01-14 PROCEDURE — G8484 FLU IMMUNIZE NO ADMIN: HCPCS | Performed by: FAMILY MEDICINE

## 2022-01-14 PROCEDURE — 4040F PNEUMOC VAC/ADMIN/RCVD: CPT | Performed by: FAMILY MEDICINE

## 2022-01-14 PROCEDURE — 99214 OFFICE O/P EST MOD 30 MIN: CPT | Performed by: FAMILY MEDICINE

## 2022-01-14 PROCEDURE — 1123F ACP DISCUSS/DSCN MKR DOCD: CPT | Performed by: FAMILY MEDICINE

## 2022-01-14 PROCEDURE — G8420 CALC BMI NORM PARAMETERS: HCPCS | Performed by: FAMILY MEDICINE

## 2022-01-14 ASSESSMENT — ENCOUNTER SYMPTOMS
EYES NEGATIVE: 1
ALLERGIC/IMMUNOLOGIC NEGATIVE: 1
GASTROINTESTINAL NEGATIVE: 1
RESPIRATORY NEGATIVE: 1

## 2022-01-14 NOTE — PROGRESS NOTES
Subjective:      Patient ID: Will Brandt is a 66 y.o. male. Hypertension  This is a chronic problem. The current episode started more than 1 month ago. The problem has been gradually improving since onset. The problem is controlled. Associated symptoms include anxiety. Risk factors for coronary artery disease include dyslipidemia and diabetes mellitus. Past treatments include angiotensin blockers and diuretics. The current treatment provides moderate improvement. There are no compliance problems. Hypertensive end-organ damage includes kidney disease. Identifiable causes of hypertension include chronic renal disease. Review of Systems   Constitutional: Negative. HENT: Negative. Eyes: Negative. Respiratory: Negative. Cardiovascular: Negative. Gastrointestinal: Negative. Endocrine: Negative. Musculoskeletal: Negative. Skin: Negative. Allergic/Immunologic: Negative. Neurological: Negative. Hematological: Negative. Psychiatric/Behavioral: The patient is nervous/anxious. Past family and social history unremarkable. Diagnosis Orders   1. Essential hypertension     2. Erectile dysfunction due to arterial insufficiency     3. Benign non-nodular prostatic hyperplasia without lower urinary tract symptoms     4. Adenomatous polyp of colon, unspecified part of colon     5. Prediabetes     6. Dyslipidemia     7. Daily consumption of alcohol     8. Renal insufficiency           Objective:   Physical Exam  Vitals and nursing note reviewed. Constitutional:       Appearance: He is well-developed. HENT:      Head: Normocephalic and atraumatic. Right Ear: External ear normal.      Left Ear: External ear normal.      Nose: Nose normal.   Eyes:      Conjunctiva/sclera: Conjunctivae normal.      Pupils: Pupils are equal, round, and reactive to light. Cardiovascular:      Rate and Rhythm: Normal rate and regular rhythm. Heart sounds: Normal heart sounds. use  Degenerative polyarthralgia. May take over-the-counter Tylenol as needed. Avoid anti-inflammatory because of CKD  He is up-to-date on COVID and influenza vaccination  Med list and available labs reviewed, discussed with patient, questions answered  Is encouraged to call for any concern  This note is created with a voice recognition program and while intend to generate a document that accurately reflects the content of the visit, no guarantee can be provided that every mistake has been identified and corrected by editing.           Carlo Crowder MD

## 2022-02-04 DIAGNOSIS — I10 ESSENTIAL HYPERTENSION: ICD-10-CM

## 2022-02-07 RX ORDER — VALSARTAN AND HYDROCHLOROTHIAZIDE 160; 12.5 MG/1; MG/1
TABLET, FILM COATED ORAL
Qty: 90 TABLET | Refills: 1 | Status: SHIPPED | OUTPATIENT
Start: 2022-02-07 | End: 2022-07-21

## 2022-02-07 NOTE — TELEPHONE ENCOUNTER
Mario Alberto Mc is calling to request a refill on the following medication(s):    Medication Request:  Requested Prescriptions     Pending Prescriptions Disp Refills    valsartan-hydroCHLOROthiazide (DIOVAN-HCT) 160-12.5 MG per tablet [Pharmacy Med Name: VALSARTAN/HYDROCHLOROTHIAZIDE 160-12.5 MG Tablet] 45 tablet 0     Sig: TAKE 1 TABLET EVERY DAY     Last filled 12/14/21 # 45 no refill    Last Visit Date (If Applicable):  3/80/7590    Next Visit Date:    5/13/2022

## 2022-05-13 ENCOUNTER — OFFICE VISIT (OUTPATIENT)
Dept: INTERNAL MEDICINE CLINIC | Age: 72
End: 2022-05-13
Payer: MEDICARE

## 2022-05-13 ENCOUNTER — HOSPITAL ENCOUNTER (OUTPATIENT)
Age: 72
Setting detail: SPECIMEN
Discharge: HOME | End: 2022-05-13

## 2022-05-13 VITALS
RESPIRATION RATE: 16 BRPM | DIASTOLIC BLOOD PRESSURE: 82 MMHG | OXYGEN SATURATION: 96 % | HEART RATE: 72 BPM | WEIGHT: 162 LBS | HEIGHT: 68 IN | SYSTOLIC BLOOD PRESSURE: 138 MMHG | TEMPERATURE: 97 F | BODY MASS INDEX: 24.55 KG/M2

## 2022-05-13 DIAGNOSIS — D12.5 ADENOMATOUS POLYP OF SIGMOID COLON: ICD-10-CM

## 2022-05-13 DIAGNOSIS — R73.03 PREDIABETES: ICD-10-CM

## 2022-05-13 DIAGNOSIS — N40.0 BENIGN NON-NODULAR PROSTATIC HYPERPLASIA WITHOUT LOWER URINARY TRACT SYMPTOMS: ICD-10-CM

## 2022-05-13 DIAGNOSIS — E78.5 DYSLIPIDEMIA: ICD-10-CM

## 2022-05-13 DIAGNOSIS — Z28.21 PNEUMOCOCCAL VACCINATION DECLINED: ICD-10-CM

## 2022-05-13 DIAGNOSIS — I10 ESSENTIAL HYPERTENSION: Primary | ICD-10-CM

## 2022-05-13 DIAGNOSIS — N52.01 ERECTILE DYSFUNCTION DUE TO ARTERIAL INSUFFICIENCY: ICD-10-CM

## 2022-05-13 DIAGNOSIS — Z78.9 DAILY CONSUMPTION OF ALCOHOL: ICD-10-CM

## 2022-05-13 DIAGNOSIS — N28.9 RENAL INSUFFICIENCY: ICD-10-CM

## 2022-05-13 DIAGNOSIS — Z28.21 TETANUS, DIPHTHERIA, AND ACELLULAR PERTUSSIS (TDAP) VACCINATION DECLINED: ICD-10-CM

## 2022-05-13 LAB
ALBUMIN SERPL-MCNC: 4.6 G/DL (ref 3.5–5.2)
ALBUMIN/GLOBULIN RATIO: 1.4 (ref 1–2.5)
ALP BLD-CCNC: 90 U/L (ref 40–129)
ALT SERPL-CCNC: 25 U/L (ref 5–41)
ANION GAP SERPL CALCULATED.3IONS-SCNC: 11 MMOL/L (ref 9–17)
AST SERPL-CCNC: 21 U/L
BILIRUB SERPL-MCNC: 0.26 MG/DL (ref 0.3–1.2)
BILIRUBIN DIRECT: <0.08 MG/DL
BILIRUBIN, INDIRECT: ABNORMAL MG/DL (ref 0–1)
BUN BLDV-MCNC: 14 MG/DL (ref 8–23)
CALCIUM SERPL-MCNC: 9.6 MG/DL (ref 8.6–10.4)
CHLORIDE BLD-SCNC: 104 MMOL/L (ref 98–107)
CO2: 25 MMOL/L (ref 20–31)
CREAT SERPL-MCNC: 1.11 MG/DL (ref 0.7–1.2)
ESTIMATED AVERAGE GLUCOSE: 146 MG/DL
GFR AFRICAN AMERICAN: >60 ML/MIN
GFR NON-AFRICAN AMERICAN: >60 ML/MIN
GFR SERPL CREATININE-BSD FRML MDRD: ABNORMAL ML/MIN/{1.73_M2}
GLUCOSE BLD-MCNC: 111 MG/DL (ref 70–99)
HBA1C MFR BLD: 6.7 % (ref 4–6)
POTASSIUM SERPL-SCNC: 4.4 MMOL/L (ref 3.7–5.3)
SODIUM BLD-SCNC: 140 MMOL/L (ref 135–144)
TOTAL PROTEIN: 7.9 G/DL (ref 6.4–8.3)

## 2022-05-13 PROCEDURE — 3017F COLORECTAL CA SCREEN DOC REV: CPT | Performed by: FAMILY MEDICINE

## 2022-05-13 PROCEDURE — 1123F ACP DISCUSS/DSCN MKR DOCD: CPT | Performed by: FAMILY MEDICINE

## 2022-05-13 PROCEDURE — G8420 CALC BMI NORM PARAMETERS: HCPCS | Performed by: FAMILY MEDICINE

## 2022-05-13 PROCEDURE — 99214 OFFICE O/P EST MOD 30 MIN: CPT | Performed by: FAMILY MEDICINE

## 2022-05-13 PROCEDURE — 1036F TOBACCO NON-USER: CPT | Performed by: FAMILY MEDICINE

## 2022-05-13 PROCEDURE — 4040F PNEUMOC VAC/ADMIN/RCVD: CPT | Performed by: FAMILY MEDICINE

## 2022-05-13 PROCEDURE — G8427 DOCREV CUR MEDS BY ELIG CLIN: HCPCS | Performed by: FAMILY MEDICINE

## 2022-05-13 ASSESSMENT — ENCOUNTER SYMPTOMS
EYES NEGATIVE: 1
ALLERGIC/IMMUNOLOGIC NEGATIVE: 1
RESPIRATORY NEGATIVE: 1
GASTROINTESTINAL NEGATIVE: 1

## 2022-05-13 ASSESSMENT — PATIENT HEALTH QUESTIONNAIRE - PHQ9
SUM OF ALL RESPONSES TO PHQ QUESTIONS 1-9: 0
1. LITTLE INTEREST OR PLEASURE IN DOING THINGS: 0
SUM OF ALL RESPONSES TO PHQ QUESTIONS 1-9: 0
SUM OF ALL RESPONSES TO PHQ9 QUESTIONS 1 & 2: 0
2. FEELING DOWN, DEPRESSED OR HOPELESS: 0
SUM OF ALL RESPONSES TO PHQ QUESTIONS 1-9: 0
SUM OF ALL RESPONSES TO PHQ QUESTIONS 1-9: 0

## 2022-05-13 NOTE — PROGRESS NOTES
Subjective:      Patient ID: Tamie Hernandez is a 74 y.o. male. Hypertension  This is a chronic problem. The current episode started more than 1 month ago. The problem has been gradually improving since onset. The problem is controlled. Associated symptoms include anxiety. Past treatments include angiotensin blockers and diuretics. Compliance problems include psychosocial issues. Hypertensive end-organ damage includes kidney disease. Identifiable causes of hypertension include chronic renal disease. Review of Systems   Constitutional: Negative. HENT: Negative. Eyes: Negative. Respiratory: Negative. Cardiovascular: Negative. Gastrointestinal: Negative. Endocrine: Negative. Musculoskeletal: Negative. Skin: Negative. Allergic/Immunologic: Negative. Neurological: Negative. Hematological: Negative. Psychiatric/Behavioral: Negative. Past family and social history unremarkable. Diagnosis Orders   1. Essential hypertension     2. Erectile dysfunction due to arterial insufficiency     3. Benign non-nodular prostatic hyperplasia without lower urinary tract symptoms     4. Adenomatous polyp of sigmoid colon     5. Prediabetes  Basic Metabolic Panel    Hemoglobin A1C    Hepatic Function Panel   6. Dyslipidemia  Basic Metabolic Panel    Hemoglobin A1C    Hepatic Function Panel   7. Daily consumption of alcohol  Basic Metabolic Panel    Hemoglobin A1C    Hepatic Function Panel   8. Renal insufficiency  Basic Metabolic Panel    Hemoglobin A1C    Hepatic Function Panel   9. Tetanus, diphtheria, and acellular pertussis (Tdap) vaccination declined     10. Pneumococcal vaccination declined           Objective:   Physical Exam  Vitals and nursing note reviewed. Constitutional:       Appearance: He is well-developed. HENT:      Head: Normocephalic and atraumatic.       Right Ear: External ear normal.      Left Ear: External ear normal.      Nose: Nose normal.   Eyes: Conjunctiva/sclera: Conjunctivae normal.      Pupils: Pupils are equal, round, and reactive to light. Cardiovascular:      Rate and Rhythm: Normal rate and regular rhythm. Heart sounds: Normal heart sounds. Comments: Hypertension  Dyslipidemia  Impaired fasting glucose  Pulmonary:      Effort: Pulmonary effort is normal.      Breath sounds: Normal breath sounds. Abdominal:      General: Bowel sounds are normal.      Palpations: Abdomen is soft. Genitourinary:     Comments: CKD  Musculoskeletal:         General: Normal range of motion. Cervical back: Normal range of motion and neck supple. Skin:     General: Skin is warm and dry. Neurological:      Mental Status: He is alert and oriented to person, place, and time. Deep Tendon Reflexes: Reflexes are normal and symmetric. Psychiatric:      Comments: Regular consumption of excessive alcohol         Assessment:       Diagnosis Orders   1. Essential hypertension     2. Erectile dysfunction due to arterial insufficiency     3. Benign non-nodular prostatic hyperplasia without lower urinary tract symptoms     4. Adenomatous polyp of sigmoid colon     5. Prediabetes  Basic Metabolic Panel    Hemoglobin A1C    Hepatic Function Panel   6. Dyslipidemia  Basic Metabolic Panel    Hemoglobin A1C    Hepatic Function Panel   7. Daily consumption of alcohol  Basic Metabolic Panel    Hemoglobin A1C    Hepatic Function Panel   8. Renal insufficiency  Basic Metabolic Panel    Hemoglobin A1C    Hepatic Function Panel   9. Tetanus, diphtheria, and acellular pertussis (Tdap) vaccination declined     10. Pneumococcal vaccination declined             Plan:      66-year-old -American male returns for follow-up. He is afebrile and hemodynamically stable, clinical examination is stable at baseline  Essential hypertension on Diovan HCT that he is tolerating well.   He is advised to continue current regimen, consume less than 2 g of salt a day  Impaired fasting glucose with modest but gradual rise in A1c currently at 6.4. He is advised low-fat high-fiber diet, daily moderate exercise and lifestyle change  Dyslipidemia being nonpharmacologically treated  CKD with modest but progressive worsening of kidney function currently creatinine is 1. 13. Avoid nephrotoxic drugs, anti-inflammatory radiocontrast.  Maintain oral hydration and urine output  Regular heavy consumption of alcohol. He is advised to limit his alcohol intake to 1-2 drinks a day. Join alcohol Anonymous. Once again he declined influenza, Tdap. He is updated on colonoscopy with polypectomy done by GI in 2016  Asymptomatic BPH  Med list reviewed advised to continue  Further recommendations to follow updated labs  Is encouraged to call for any concern  This note is created with a voice recognition program and while intend to generate a document that accurately reflects the content of the visit, no guarantee can be provided that every mistake has been identified and corrected by editing.             Magalie Najera MD

## 2022-06-23 DIAGNOSIS — I10 ESSENTIAL HYPERTENSION: ICD-10-CM

## 2022-06-23 RX ORDER — VALSARTAN AND HYDROCHLOROTHIAZIDE 160; 12.5 MG/1; MG/1
TABLET, FILM COATED ORAL
Qty: 90 TABLET | Refills: 1 | OUTPATIENT
Start: 2022-06-23

## 2022-06-23 NOTE — TELEPHONE ENCOUNTER
Agnes Elmore is calling to request a refill on the following medication(s):    Medication Request:  Requested Prescriptions     Pending Prescriptions Disp Refills    valsartan-hydroCHLOROthiazide (DIOVAN-HCT) 160-12.5 MG per tablet [Pharmacy Med Name: VALSARTAN/HYDROCHLOROTHIAZIDE 160-12.5 MG Tablet] 90 tablet 1     Sig: TAKE 1 TABLET EVERY DAY     Last filled 2/7/22 90 day 1 refill  Not due    Last Visit Date (If Applicable):  6/89/3794    Next Visit Date:    Visit date not found

## 2022-07-21 DIAGNOSIS — I10 ESSENTIAL HYPERTENSION: ICD-10-CM

## 2022-07-21 RX ORDER — VALSARTAN AND HYDROCHLOROTHIAZIDE 160; 12.5 MG/1; MG/1
TABLET, FILM COATED ORAL
Qty: 90 TABLET | Refills: 1 | Status: SHIPPED | OUTPATIENT
Start: 2022-07-21

## 2022-07-21 NOTE — TELEPHONE ENCOUNTER
Joey Espinosa is calling to request a refill on the following medication(s):    Medication Request:  Requested Prescriptions     Pending Prescriptions Disp Refills    valsartan-hydroCHLOROthiazide (DIOVAN-HCT) 160-12.5 MG per tablet [Pharmacy Med Name: VALSARTAN/HYDROCHLOROTHIAZIDE 160-12.5 MG Tablet] 90 tablet 1     Sig: TAKE 1 TABLET EVERY DAY       Last Visit Date (If Applicable):  7/14/1319    Next Visit Date:    Visit date not found

## 2022-08-30 ENCOUNTER — OFFICE VISIT (OUTPATIENT)
Dept: INTERNAL MEDICINE CLINIC | Age: 72
End: 2022-08-30
Payer: MEDICARE

## 2022-08-30 VITALS
RESPIRATION RATE: 16 BRPM | BODY MASS INDEX: 23.49 KG/M2 | SYSTOLIC BLOOD PRESSURE: 138 MMHG | OXYGEN SATURATION: 99 % | HEART RATE: 58 BPM | WEIGHT: 155 LBS | HEIGHT: 68 IN | DIASTOLIC BLOOD PRESSURE: 76 MMHG | TEMPERATURE: 97 F

## 2022-08-30 DIAGNOSIS — E78.5 DYSLIPIDEMIA: ICD-10-CM

## 2022-08-30 DIAGNOSIS — N28.9 RENAL INSUFFICIENCY: ICD-10-CM

## 2022-08-30 DIAGNOSIS — D12.6 ADENOMATOUS POLYP OF COLON, UNSPECIFIED PART OF COLON: ICD-10-CM

## 2022-08-30 DIAGNOSIS — Z78.9 DAILY CONSUMPTION OF ALCOHOL: ICD-10-CM

## 2022-08-30 DIAGNOSIS — Z00.00 INITIAL MEDICARE ANNUAL WELLNESS VISIT: Primary | ICD-10-CM

## 2022-08-30 DIAGNOSIS — N52.01 ERECTILE DYSFUNCTION DUE TO ARTERIAL INSUFFICIENCY: ICD-10-CM

## 2022-08-30 DIAGNOSIS — I10 ESSENTIAL HYPERTENSION: ICD-10-CM

## 2022-08-30 DIAGNOSIS — N40.0 BENIGN NON-NODULAR PROSTATIC HYPERPLASIA WITHOUT LOWER URINARY TRACT SYMPTOMS: ICD-10-CM

## 2022-08-30 DIAGNOSIS — Z28.21 TETANUS, DIPHTHERIA, AND ACELLULAR PERTUSSIS (TDAP) VACCINATION DECLINED: ICD-10-CM

## 2022-08-30 DIAGNOSIS — Z28.21 PNEUMOCOCCAL VACCINATION DECLINED: ICD-10-CM

## 2022-08-30 DIAGNOSIS — E11.9 TYPE 2 DIABETES, DIET CONTROLLED (HCC): ICD-10-CM

## 2022-08-30 PROBLEM — R73.03 PREDIABETES: Status: RESOLVED | Noted: 2017-05-03 | Resolved: 2022-08-30

## 2022-08-30 PROCEDURE — 3044F HG A1C LEVEL LT 7.0%: CPT | Performed by: FAMILY MEDICINE

## 2022-08-30 PROCEDURE — 3017F COLORECTAL CA SCREEN DOC REV: CPT | Performed by: FAMILY MEDICINE

## 2022-08-30 PROCEDURE — 1123F ACP DISCUSS/DSCN MKR DOCD: CPT | Performed by: FAMILY MEDICINE

## 2022-08-30 PROCEDURE — G0438 PPPS, INITIAL VISIT: HCPCS | Performed by: FAMILY MEDICINE

## 2022-08-30 RX ORDER — ATORVASTATIN CALCIUM 20 MG/1
20 TABLET, FILM COATED ORAL DAILY
Qty: 90 TABLET | Refills: 0 | Status: SHIPPED | OUTPATIENT
Start: 2022-08-30 | End: 2022-11-02

## 2022-08-30 ASSESSMENT — PATIENT HEALTH QUESTIONNAIRE - PHQ9
SUM OF ALL RESPONSES TO PHQ QUESTIONS 1-9: 0
SUM OF ALL RESPONSES TO PHQ QUESTIONS 1-9: 0
1. LITTLE INTEREST OR PLEASURE IN DOING THINGS: 0
SUM OF ALL RESPONSES TO PHQ9 QUESTIONS 1 & 2: 0
2. FEELING DOWN, DEPRESSED OR HOPELESS: 0
SUM OF ALL RESPONSES TO PHQ QUESTIONS 1-9: 0
SUM OF ALL RESPONSES TO PHQ QUESTIONS 1-9: 0

## 2022-08-30 ASSESSMENT — LIFESTYLE VARIABLES
HOW OFTEN DO YOU HAVE A DRINK CONTAINING ALCOHOL: 4 OR MORE TIMES A WEEK
HOW OFTEN DURING THE LAST YEAR HAVE YOU BEEN UNABLE TO REMEMBER WHAT HAPPENED THE NIGHT BEFORE BECAUSE YOU HAD BEEN DRINKING: 0
HOW OFTEN DURING THE LAST YEAR HAVE YOU HAD A FEELING OF GUILT OR REMORSE AFTER DRINKING: 0
HAS A RELATIVE, FRIEND, DOCTOR, OR ANOTHER HEALTH PROFESSIONAL EXPRESSED CONCERN ABOUT YOUR DRINKING OR SUGGESTED YOU CUT DOWN: 0
HAVE YOU OR SOMEONE ELSE BEEN INJURED AS A RESULT OF YOUR DRINKING: 0
HOW MANY STANDARD DRINKS CONTAINING ALCOHOL DO YOU HAVE ON A TYPICAL DAY: 1 OR 2
HOW OFTEN DURING THE LAST YEAR HAVE YOU FOUND THAT YOU WERE NOT ABLE TO STOP DRINKING ONCE YOU HAD STARTED: 0
HOW OFTEN DURING THE LAST YEAR HAVE YOU NEEDED AN ALCOHOLIC DRINK FIRST THING IN THE MORNING TO GET YOURSELF GOING AFTER A NIGHT OF HEAVY DRINKING: 0
HOW OFTEN DURING THE LAST YEAR HAVE YOU FAILED TO DO WHAT WAS NORMALLY EXPECTED FROM YOU BECAUSE OF DRINKING: 0

## 2022-08-30 NOTE — PROGRESS NOTES
Medicare Annual Wellness Visit    Lisa Vinson is here for Medicare AWV  67-year-old -American male is presented requesting Medicare annual.  He denies any new distress, afebrile hemodynamically stable  Systemic exam  HEENT unremarkable  Neck unremarkable  Lungs bilateral CTA  CVS S1-S2 regular rate and rhythm  Abdomen soft discomfort benign to palpation normoactive bowel sound  CNX anxious however does not express any obvious acute focal sensorimotor deficit  Lower extremity no pedal edema  Skin no tenting no lesions  Assessment & Plan    Diagnosis Orders   1. Initial Medicare annual wellness visit        2. Tetanus, diphtheria, and acellular pertussis (Tdap) vaccination declined        3. Pneumococcal vaccination declined        4. Essential hypertension        5. Erectile dysfunction due to arterial insufficiency        6. Benign non-nodular prostatic hyperplasia without lower urinary tract symptoms        7. Adenomatous polyp of colon, unspecified part of colon        8. Dyslipidemia        9. Daily consumption of alcohol        10. Renal insufficiency        11. Type 2 diabetes, diet controlled ([facility] [address]          Assessment and plan  Patient presented for Medicare annual.  No apparent new distress  Recent labs reviewed, discussed with patient, questions answered  New onset diabetes mellitus. Gradual trend up on his A1c currently at 6.7. Conservative versus pharmaceutical management offered. Patient wished to proceed with metformin. Caution GI upset. He would benefit from ADA 1800 diet, daily moderate exercise lifestyle change  Dyslipidemia with hypertriglyceridemia. Advise low-fat high-fiber diet, daily moderate exercise. He is placed on low-dose statin  Hemodynamically stable with random blood pressure equal less than 130/80. He has been tolerating ACE inhibitor well  Monofilament testing is unremarkable.   He is counseled on diabetic foot care  He is advised to update annual dilated eye exam  Erectile dysfunction Viagra that he is tolerating well  He appears anxious however denies being depressed  He consumes alcohol daily. However denies being alcoholic  CKD with creatinine of 1.12. Avoid nephrotoxic drugs, anti-inflammatory radiocontrast.  Continue ACE inhibitor  Once again he declined tetanus and pneumococcal vaccine. Asymptomatic BPH  Initial Medicare annual wellness visit    Recommendations for Preventive Services Due: see orders and patient instructions/AVS.  Recommended screening schedule for the next 5-10 years is provided to the patient in written form: see Patient Instructions/AVS.     Return for Medicare Annual Wellness Visit in 1 year. Subjective   The following acute and/or chronic problems were also addressed today:    Patient's complete Health Risk Assessment and screening values have been reviewed and are found in Flowsheets. The following problems were reviewed today and where indicated follow up appointments were made and/or referrals ordered. Positive Risk Factor Screenings with Interventions:     Cognitive:   Words recalled: 1 Word Recalled  Clock Drawing Test (CDT): (!) Abnormal  Total Score Interpretation: Abnormal Mini-Cog  Did the patient refuse to take the cognition test?: No  Cognitive Impairment Interventions:              General Health and ACP:  General  In general, how would you say your health is?: Good  In the past 7 days, have you experienced any of the following: New or Increased Pain, New or Increased Fatigue, Loneliness, Social Isolation, Stress or Anger?: No  Do you get the social and emotional support that you need?: Yes  Do you have a Living Will?: Yes    Advance Directives       Power of  Living Will ACP-Advance Directive ACP-Power of     Not on File Not on File Not on File Not on File          General Health Risk Interventions:       Health Habits/Nutrition:  Physical Activity: Sufficiently Active    Days of Exercise per Week: 7 days Minutes of Exercise per Session: 30 min     Have you lost any weight without trying in the past 3 months?: No  Body mass index: 23.57  Have you seen the dentist within the past year?: (!) No  Health Habits/Nutrition Interventions:       Hearing/Vision:  Do you or your family notice any trouble with your hearing that hasn't been managed with hearing aids?: No  Do you have difficulty driving, watching TV, or doing any of your daily activities because of your eyesight?: No  Have you had an eye exam within the past year?: (!) No  No results found. Hearing/Vision Interventions:               Objective   Vitals:    08/30/22 0843   BP: 138/76   Site: Left Upper Arm   Position: Sitting   Cuff Size: Medium Adult   Pulse: 58   Resp: 16   Temp: 97 °F (36.1 °C)   TempSrc: Temporal   SpO2: 99%   Weight: 155 lb (70.3 kg)   Height: 5' 8\" (1.727 m)      Body mass index is 23.57 kg/m². No Known Allergies  Prior to Visit Medications    Medication Sig Taking? Authorizing Provider   valsartan-hydroCHLOROthiazide (DIOVAN-HCT) 160-12.5 MG per tablet TAKE 1 Caio Monique MD   sildenafil (VIAGRA) 100 MG tablet One tablet daily as needed for ED.   Take one hour prior  Patient not taking: No sig reported  Leonie Elliosn MD       [facility] (Including outside providers/suppliers regularly involved in providing care):   Patient Care Team:  James Jordan MD as PCP - General (Family Medicine)  James Jordan MD as PCP - [facility] Empaneled Provider  Carlos Briggs MD as Consulting Physician (Urology)  Gumaro White MD as Consulting Physician (Gastroenterology)     Reviewed and updated this visit:  Tobacco  Allergies  Meds  Med Hx  Surg Hx  Soc Hx  Fam Hx    This note is created with a voice recognition program and while intend to generate a document that accurately reflects the content of the visit, no guarantee can be provided that every mistake has been identified and corrected by editing.

## 2022-08-30 NOTE — PATIENT INSTRUCTIONS
Personalized Preventive Plan for Vikki Standard - 8/30/2022  Medicare offers a range of preventive health benefits. Some of the tests and screenings are paid in full while other may be subject to a deductible, co-insurance, and/or copay. Some of these benefits include a comprehensive review of your medical history including lifestyle, illnesses that may run in your family, and various assessments and screenings as appropriate. After reviewing your medical record and screening and assessments performed today your provider may have ordered immunizations, labs, imaging, and/or referrals for you. A list of these orders (if applicable) as well as your Preventive Care list are included within your After Visit Summary for your review. Other Preventive Recommendations:    A preventive eye exam performed by an eye specialist is recommended every 1-2 years to screen for glaucoma; cataracts, macular degeneration, and other eye disorders. A preventive dental visit is recommended every 6 months. Try to get at least 150 minutes of exercise per week or 10,000 steps per day on a pedometer . Order or download the FREE \"Exercise & Physical Activity: Your Everyday Guide\" from [employer]. Call 9-779.608.6978 or search [employer] online. You need 5769-1900 mg of calcium and 9993-3222 IU of vitamin D per day. It is possible to meet your calcium requirement with diet alone, but a vitamin D supplement is usually necessary to meet this goal.  When exposed to the sun, use a sunscreen that protects against both UVA and UVB radiation with an SPF of 30 or greater. Reapply every 2 to 3 hours or after sweating, drying off with a towel, or swimming. Always wear a seat belt when traveling in a car. Always wear a helmet when riding a bicycle or motorcycle.

## 2022-11-02 RX ORDER — ATORVASTATIN CALCIUM 20 MG/1
TABLET, FILM COATED ORAL
Qty: 90 TABLET | Refills: 0 | Status: SHIPPED | OUTPATIENT
Start: 2022-11-02

## 2022-11-02 NOTE — TELEPHONE ENCOUNTER
Reggie December is calling to request a refill on the following medication(s):    Medication Request:  Requested Prescriptions     Pending Prescriptions Disp Refills    atorvastatin (LIPITOR) 20 MG tablet [Pharmacy Med Name: ATORVASTATIN CALCIUM 20 MG Tablet] 90 tablet 0     Sig: TAKE 1 TABLET EVERY DAY       Last Visit Date (If Applicable):  2/78/4713    Next Visit Date:    11/30/2022

## 2022-11-30 ENCOUNTER — OFFICE VISIT (OUTPATIENT)
Dept: INTERNAL MEDICINE CLINIC | Age: 72
End: 2022-11-30
Payer: MEDICARE

## 2022-11-30 ENCOUNTER — HOSPITAL ENCOUNTER (OUTPATIENT)
Age: 72
Setting detail: SPECIMEN
Discharge: HOME | End: 2022-11-30

## 2022-11-30 VITALS
TEMPERATURE: 97.6 F | WEIGHT: 156 LBS | SYSTOLIC BLOOD PRESSURE: 110 MMHG | DIASTOLIC BLOOD PRESSURE: 62 MMHG | HEART RATE: 76 BPM | HEIGHT: 68 IN | RESPIRATION RATE: 16 BRPM | BODY MASS INDEX: 23.64 KG/M2 | OXYGEN SATURATION: 96 %

## 2022-11-30 DIAGNOSIS — N52.01 ERECTILE DYSFUNCTION DUE TO ARTERIAL INSUFFICIENCY: ICD-10-CM

## 2022-11-30 DIAGNOSIS — Z28.21 PNEUMOCOCCAL VACCINATION DECLINED: ICD-10-CM

## 2022-11-30 DIAGNOSIS — I10 ESSENTIAL HYPERTENSION: ICD-10-CM

## 2022-11-30 DIAGNOSIS — Z23 NEED FOR INFLUENZA VACCINATION: ICD-10-CM

## 2022-11-30 DIAGNOSIS — Z78.9 DAILY CONSUMPTION OF ALCOHOL: ICD-10-CM

## 2022-11-30 DIAGNOSIS — E11.22 TYPE 2 DIABETES MELLITUS WITH STAGE 2 CHRONIC KIDNEY DISEASE, WITHOUT LONG-TERM CURRENT USE OF INSULIN (HCC): Primary | ICD-10-CM

## 2022-11-30 DIAGNOSIS — Z28.21 TETANUS, DIPHTHERIA, AND ACELLULAR PERTUSSIS (TDAP) VACCINATION DECLINED: ICD-10-CM

## 2022-11-30 DIAGNOSIS — N28.9 RENAL INSUFFICIENCY: ICD-10-CM

## 2022-11-30 DIAGNOSIS — E78.5 DYSLIPIDEMIA: ICD-10-CM

## 2022-11-30 DIAGNOSIS — N18.2 TYPE 2 DIABETES MELLITUS WITH STAGE 2 CHRONIC KIDNEY DISEASE, WITHOUT LONG-TERM CURRENT USE OF INSULIN (HCC): Primary | ICD-10-CM

## 2022-11-30 DIAGNOSIS — D12.6 ADENOMATOUS POLYP OF COLON, UNSPECIFIED PART OF COLON: ICD-10-CM

## 2022-11-30 DIAGNOSIS — N40.0 BENIGN NON-NODULAR PROSTATIC HYPERPLASIA WITHOUT LOWER URINARY TRACT SYMPTOMS: ICD-10-CM

## 2022-11-30 LAB
ANION GAP SERPL CALCULATED.3IONS-SCNC: 12 MMOL/L (ref 9–17)
BUN BLDV-MCNC: 19 MG/DL (ref 8–23)
CALCIUM SERPL-MCNC: 9.6 MG/DL (ref 8.6–10.4)
CHLORIDE BLD-SCNC: 101 MMOL/L (ref 98–107)
CO2: 25 MMOL/L (ref 20–31)
CREAT SERPL-MCNC: 1.06 MG/DL (ref 0.7–1.2)
ESTIMATED AVERAGE GLUCOSE: 131 MG/DL
GFR SERPL CREATININE-BSD FRML MDRD: >60 ML/MIN/1.73M2
GLUCOSE BLD-MCNC: 101 MG/DL (ref 70–99)
HBA1C MFR BLD: 6.2 % (ref 4–6)
POTASSIUM SERPL-SCNC: 4.7 MMOL/L (ref 3.7–5.3)
SODIUM BLD-SCNC: 138 MMOL/L (ref 135–144)

## 2022-11-30 PROCEDURE — 3074F SYST BP LT 130 MM HG: CPT | Performed by: FAMILY MEDICINE

## 2022-11-30 PROCEDURE — 1123F ACP DISCUSS/DSCN MKR DOCD: CPT | Performed by: FAMILY MEDICINE

## 2022-11-30 PROCEDURE — 90694 VACC AIIV4 NO PRSRV 0.5ML IM: CPT | Performed by: FAMILY MEDICINE

## 2022-11-30 PROCEDURE — 3044F HG A1C LEVEL LT 7.0%: CPT | Performed by: FAMILY MEDICINE

## 2022-11-30 PROCEDURE — 3017F COLORECTAL CA SCREEN DOC REV: CPT | Performed by: FAMILY MEDICINE

## 2022-11-30 PROCEDURE — 2022F DILAT RTA XM EVC RTNOPTHY: CPT | Performed by: FAMILY MEDICINE

## 2022-11-30 PROCEDURE — G8427 DOCREV CUR MEDS BY ELIG CLIN: HCPCS | Performed by: FAMILY MEDICINE

## 2022-11-30 PROCEDURE — G8484 FLU IMMUNIZE NO ADMIN: HCPCS | Performed by: FAMILY MEDICINE

## 2022-11-30 PROCEDURE — 3078F DIAST BP <80 MM HG: CPT | Performed by: FAMILY MEDICINE

## 2022-11-30 PROCEDURE — G8420 CALC BMI NORM PARAMETERS: HCPCS | Performed by: FAMILY MEDICINE

## 2022-11-30 PROCEDURE — 1036F TOBACCO NON-USER: CPT | Performed by: FAMILY MEDICINE

## 2022-11-30 PROCEDURE — 99214 OFFICE O/P EST MOD 30 MIN: CPT | Performed by: FAMILY MEDICINE

## 2022-11-30 PROCEDURE — G0008 ADMIN INFLUENZA VIRUS VAC: HCPCS | Performed by: FAMILY MEDICINE

## 2022-11-30 ASSESSMENT — ENCOUNTER SYMPTOMS
GASTROINTESTINAL NEGATIVE: 1
EYES NEGATIVE: 1
RESPIRATORY NEGATIVE: 1
ALLERGIC/IMMUNOLOGIC NEGATIVE: 1

## 2022-11-30 NOTE — PROGRESS NOTES
symptoms        7. Adenomatous polyp of colon, unspecified part of colon        8. Dyslipidemia        9. Daily consumption of alcohol        10. Renal insufficiency  Basic Metabolic Panel    Hemoglobin A1C      11. Need for influenza vaccination  Influenza, FLUAD, (age 72 y+), IM, PF, 0.5 mL          Objective:   Physical Exam  Vitals and nursing note reviewed. Constitutional:       Appearance: He is well-developed. HENT:      Head: Normocephalic and atraumatic. Right Ear: External ear normal.      Left Ear: External ear normal.      Nose: Nose normal.   Eyes:      Conjunctiva/sclera: Conjunctivae normal.      Pupils: Pupils are equal, round, and reactive to light. Cardiovascular:      Rate and Rhythm: Normal rate and regular rhythm. Heart sounds: Normal heart sounds. Comments: Diabetes  Hypertension  Hyperlipidemia  Pulmonary:      Effort: Pulmonary effort is normal.      Breath sounds: Normal breath sounds. Abdominal:      General: Bowel sounds are normal.      Palpations: Abdomen is soft. Genitourinary:     Comments: ED  Diabetic nephropathy  Musculoskeletal:         General: Normal range of motion. Cervical back: Normal range of motion and neck supple. Skin:     General: Skin is warm and dry. Neurological:      Mental Status: He is alert and oriented to person, place, and time. Deep Tendon Reflexes: Reflexes are normal and symmetric. Psychiatric:         Thought Content: Thought content normal.      Comments: Anxiety  Daily consumption of alcohol       Assessment:       Diagnosis Orders   1. Type 2 diabetes mellitus with stage 2 chronic kidney disease, without long-term current use of insulin ([facility] [address]        2. Tetanus, diphtheria, and acellular pertussis (Tdap) vaccination declined        3. Pneumococcal vaccination declined        4. Essential hypertension        5. Erectile dysfunction due to arterial insufficiency        6.  Benign non-nodular prostatic hyperplasia without lower urinary tract symptoms        7. Adenomatous polyp of colon, unspecified part of colon        8. Dyslipidemia        9. Daily consumption of alcohol        10. Renal insufficiency  Basic Metabolic Panel    Hemoglobin A1C      11. Need for influenza vaccination  Influenza, FLUAD, (age 72 y+), IM, PF, 0.5 mL              Plan:      75-year-old -American male returns for follow-up. He is afebrile hemodynamically stable, clinical examination is benign  Diabetes mellitus. A1c is 6.7 which is modest worsening. He is advised to increase metformin from 500 mg once daily to twice daily. Adhere to ADA 1800 diet, daily moderate exercise, lifestyle change and weight reduction to keep BMI around 25  Hypertension well-controlled to ARB that he is tolerating well. Optimize diabetes and consume less than 2 g of salt a day  Hyperlipidemia on statin that he is tolerating well  Monofilament testing is unremarkable. He is counseled on diabetic foot care  He is advised to update annual dilated eye exam  Diabetic nephropathy with creatinine of 1.12. Avoid nephrotoxic drugs, anti-inflammatory radiocontrast.  Daily consumption of alcohol. He is advised to limit alcohol intake to 1 drink a day. Further recommendations to follow updated labs  Call for any concern  This note is created with a voice recognition program and while intend to generate a document that accurately reflects the content of the visit, no guarantee can be provided that every mistake has been identified and corrected by editing.           Papa Edward MD

## 2022-12-01 ENCOUNTER — HOSPITAL ENCOUNTER (EMERGENCY)
Age: 72
Discharge: HOME | End: 2022-12-01
Attending: EMERGENCY MEDICINE
Payer: MEDICARE

## 2022-12-01 VITALS
SYSTOLIC BLOOD PRESSURE: 185 MMHG | HEART RATE: 60 BPM | TEMPERATURE: 97.8 F | RESPIRATION RATE: 18 BRPM | OXYGEN SATURATION: 96 % | DIASTOLIC BLOOD PRESSURE: 92 MMHG

## 2022-12-01 DIAGNOSIS — N30.01 ACUTE CYSTITIS WITH HEMATURIA: Primary | ICD-10-CM

## 2022-12-01 DIAGNOSIS — R31.0 GROSS HEMATURIA: ICD-10-CM

## 2022-12-01 LAB
BILIRUBIN URINE: NEGATIVE
CHP ED QC CHECK: NORMAL
COLOR: ABNORMAL
EPITHELIAL CELLS UA: NORMAL /HPF (ref 0–5)
GLUCOSE BLD-MCNC: 96 MG/DL
GLUCOSE BLD-MCNC: 96 MG/DL (ref 75–110)
GLUCOSE URINE: NEGATIVE
KETONES, URINE: NEGATIVE
LEUKOCYTE ESTERASE, URINE: ABNORMAL
NITRITE, URINE: NEGATIVE
PH UA: 6.5 (ref 5–8)
PROTEIN UA: ABNORMAL
RBC UA: NORMAL /HPF (ref 0–2)
SPECIFIC GRAVITY UA: 1.02 (ref 1–1.03)
TURBIDITY: ABNORMAL
URINE HGB: ABNORMAL
UROBILINOGEN, URINE: NORMAL
WBC UA: NORMAL /HPF (ref 0–5)

## 2022-12-01 PROCEDURE — 81001 URINALYSIS AUTO W/SCOPE: CPT

## 2022-12-01 PROCEDURE — 6370000000 HC RX 637 (ALT 250 FOR IP): Performed by: HEALTH CARE PROVIDER

## 2022-12-01 PROCEDURE — 82947 ASSAY GLUCOSE BLOOD QUANT: CPT

## 2022-12-01 PROCEDURE — 99283 EMERGENCY DEPT VISIT LOW MDM: CPT

## 2022-12-01 RX ORDER — CIPROFLOXACIN 500 MG/1
500 TABLET, FILM COATED ORAL ONCE
Status: COMPLETED | OUTPATIENT
Start: 2022-12-01 | End: 2022-12-01

## 2022-12-01 RX ORDER — CIPROFLOXACIN 500 MG/1
500 TABLET, FILM COATED ORAL 2 TIMES DAILY
Qty: 14 TABLET | Refills: 0 | Status: SHIPPED | OUTPATIENT
Start: 2022-12-01 | End: 2022-12-08

## 2022-12-01 RX ADMIN — CIPROFLOXACIN HYDROCHLORIDE 500 MG: 500 TABLET, FILM COATED ORAL at 16:03

## 2022-12-01 ASSESSMENT — PAIN - FUNCTIONAL ASSESSMENT: PAIN_FUNCTIONAL_ASSESSMENT: NONE - DENIES PAIN

## 2022-12-01 NOTE — ED PROVIDER NOTES
[facility] ED  Emergency Department Encounter  Emergency Medicine Resident     Pt Name: Jorje Ford  MRN: [id number]  [place] 1950  Date of evaluation: 12/1/22  PCP:  Tiara Girard MD    [address] [place] [address]       Chief Complaint   Patient presents with    Hematuria     Started this morning, pt believes it is an adverse effect from the flu shot    Diarrhea       HISTORY OFPRESENT ILLNESS  (Location/Symptom, Timing/Onset, Context/Setting, Quality, Duration, Modifying Factors,Severity.)      Jorje Ford is a 74 y.o. male who presents with needs of dysuria and hematuria starting this morning. Patient also complains of single loose, watery, brown stool this morning. Has no other complaints at this time. Denies any fevers, chills, headache, dizziness, chest pain, shortness of breath, abdominal pain, nausea, vomiting, flank pain, increased urinary frequency, urgency, or incomplete emptying. Patient states he had a flu shot yesterday and associates his symptoms with side effects. No history of UTI or kidney stones. PAST MEDICAL / SURGICAL / SOCIAL / FAMILY HISTORY      has a past medical history of Adenomatous colon polyp, Caffeine use, History of colon polyps, and Hypertension. has a past surgical history that includes Colonoscopy (07/15/2016). Social History     Socioeconomic History    Marital status:      Spouse name: Not on file    Number of children: 4    Years of education: Not on file    Highest education level: Not on file   Occupational History    Occupation: Retired   Tobacco Use    Smoking status: Never    Smokeless tobacco: Never   Substance and Sexual Activity    Alcohol use:  Yes     Alcohol/week: 0.0 standard drinks    Drug use: No    Sexual activity: Yes   Other Topics Concern    Not on file   Social History Narrative    Not on file     Social Determinants of Health     Financial Resource Strain: Low Risk     Difficulty of Paying Living Expenses: Not hard at all   Food Insecurity: No Food Insecurity    Worried About Running Out of Food in the Last Year: Never true    Ran Out of Food in the Last Year: Never true   Transportation Needs: Not on file   Physical Activity: Sufficiently Active    Days of Exercise per Week: 7 days    Minutes of Exercise per Session: 30 min   Stress: Not on file   Social Connections: Not on file   Intimate Partner Violence: Not on file   Housing Stability: Not on file       Family History   Problem Relation Age of Onset    Heart Attack Mother     Prostate Cancer Father         Allergies:  Patient has no known allergies. Home Medications:  Prior to Admission medications    Medication Sig Start Date End Date Taking? Authorizing Provider   ciprofloxacin (CIPRO) 500 MG tablet Take 1 tablet by mouth 2 times daily for 7 days 12/1/22 12/8/22 Yes Patricio Eubanks MD   metFORMIN (GLUCOPHAGE) 500 MG tablet Take 1 tablet by mouth 2 times daily (with meals) 11/30/22   Collin Mckinley MD   atorvastatin (LIPITOR) 20 MG tablet TAKE 1 TABLET EVERY DAY 11/2/22   Collin Mckinley MD   valsartan-hydroCHLOROthiazide (DIOVAN-HCT) 160-12.5 MG per tablet TAKE 1 TABLET EVERY DAY 7/21/22   Collin Mckinley MD   sildenafil (VIAGRA) 100 MG tablet One tablet daily as needed for ED. Take one hour prior  Patient not taking: Reported on 11/30/2022 5/20/17   Pratik Osman MD       REVIEW OFSYSTEMS    (2-9 systems for level 4, 10 or more for level 5)      Review of Systems   Constitutional:  Negative for appetite change, chills and fever. HENT:  Negative for sore throat and trouble swallowing. Respiratory:  Negative for shortness of breath. Cardiovascular:  Negative for chest pain. Gastrointestinal:  Positive for diarrhea. Negative for abdominal pain, blood in stool, constipation, nausea and vomiting. Genitourinary:  Positive for dysuria and hematuria. Negative for decreased urine volume and difficulty urinating.    Musculoskeletal: Negative for back pain. Allergic/Immunologic: Negative for immunocompromised state. Neurological:  Negative for dizziness, light-headedness and headaches. Hematological:  Does not bruise/bleed easily. PHYSICAL EXAM   (up to 7 for level 4, 8 or more forlevel 5)      INITIAL VITALS:   ED Triage Vitals [12/01/22 1349]   BP Temp Temp Source Heart Rate Resp SpO2 Height Weight   (!) 185/92 97.8 °F (36.6 °C) Oral 60 18 96 % -- --       Physical Exam  Vitals reviewed. Constitutional:       General: He is not in acute distress. Appearance: Normal appearance. He is not ill-appearing. HENT:      Head: Normocephalic and atraumatic. Right Ear: Tympanic membrane, ear canal and external ear normal.      Left Ear: Tympanic membrane, ear canal and external ear normal.      Nose: Nose normal. No rhinorrhea. Mouth/Throat:      Mouth: Mucous membranes are moist.      Pharynx: Oropharynx is clear. Eyes:      Conjunctiva/sclera: Conjunctivae normal.      Pupils: Pupils are equal, round, and reactive to light. Cardiovascular:      Rate and Rhythm: Normal rate and regular rhythm. Pulses: Normal pulses. Heart sounds: Normal heart sounds. Pulmonary:      Effort: Pulmonary effort is normal.      Breath sounds: Normal breath sounds. Abdominal:      General: There is no distension. Palpations: Abdomen is soft. Tenderness: There is no abdominal tenderness. Genitourinary:     Comments: Declined by patient  Musculoskeletal:      Cervical back: Normal range of motion and neck supple. Right lower leg: No edema. Left lower leg: No edema. Skin:     General: Skin is warm and dry. Capillary Refill: Capillary refill takes less than 2 seconds. Neurological:      General: No focal deficit present. Mental Status: He is alert and oriented to person, place, and time.    Psychiatric:         Mood and Affect: Mood normal.         Behavior: Behavior normal.       DIFFERENTIAL DIAGNOSIS     PLAN (LABS / IMAGING / EKG):  Orders Placed This Encounter   Procedures    Urinalysis with Reflex to Culture    Microscopic Urinalysis    POCT glucose    POC Glucose Fingerstick       MEDICATIONS ORDERED:  Orders Placed This Encounter   Medications    ciprofloxacin (CIPRO) tablet 500 mg     Order Specific Question:   Antimicrobial Indications     Answer:   Urinary Tract Infection    ciprofloxacin (CIPRO) 500 MG tablet     Sig: Take 1 tablet by mouth 2 times daily for 7 days     Dispense:  14 tablet     Refill:  0       DDX: UTI, prostatitis, gastroenteritis, malignancy    Initial MDM/Plan: 74 y.o. male who presents with dysuria and hematuria with single episode of diarrhea. History and physical consistent with acute cystitis. Hematuria is painful and has no issues with starting/stopping, so less likely prostate involvement. No CVA tenderness, so stone or pyelonephritis also less likely. However, given age and risk factors, will likely need a urology referral for additional testing. Will obtain urinalysis with reflex to micro and culture while here. DIAGNOSTIC RESULTS / EMERGENCYDEPARTMENT COURSE / MDM     LABS:  Labs Reviewed   URINALYSIS WITH REFLEX TO CULTURE - Abnormal; Notable for the following components:       Result Value    Urine Hgb LARGE (*)     Protein, UA 2+ (*)     Leukocyte Esterase, Urine TRACE (*)     All other components within normal limits   POCT GLUCOSE - Normal   MICROSCOPIC URINALYSIS   POC GLUCOSE FINGERSTICK         RADIOLOGY:  No results found. EMERGENCY DEPARTMENT COURSE:      Urinalysis consistent with acute cystitis. Will treat with 1 week of ciprofloxacin and refer to urology for further work-up. PROCEDURES:  None    CONSULTS:  None    CRITICAL CARE:  None    FINAL IMPRESSION      1. Acute cystitis with hematuria    2.  Gross hematuria          DISPOSITION / PLAN     DISPOSITION Decision To Discharge 12/01/2022 03:43:05 PM      PATIENT REFERRED TO:  [facility]  [address] [facility] [address]  [phone]    If symptoms worsen    [facility]  [address]  [facility] [address]  [phone]        DISCHARGE MEDICATIONS:  Discharge Medication List as of 12/1/2022  3:43 PM        START taking these medications    Details   ciprofloxacin (CIPRO) 500 MG tablet Take 1 tablet by mouth 2 times daily for 7 days, Disp-14 tablet, R-0Print             Aida Santos MD  Emergency Medicine Resident    (Please note that portions of this note were completed with a voice recognition program.Efforts were made to edit the dictations but occasionally words are mis-transcribed.)       Aida Santos MD  Resident  12/04/22 1007

## 2022-12-01 NOTE — ED PROVIDER NOTES
[facility]     Emergency Department     Faculty Attestation    I performed a history and physical examination of the patient and discussed management with the resident. I reviewed the residents note and agree with the documented findings and plan of care. Any areas of disagreement are noted on the chart. I was personally present for the key portions of any procedures. I have documented in the chart those procedures where I was not present during the key portions. I have reviewed the emergency nurses triage note. I agree with the chief complaint, past medical history, past surgical history, allergies, medications, social and family history as documented unless otherwise noted below. For Physician Assistant/ Nurse Practitioner cases/documentation I have personally evaluated this patient and have completed at least one if not all key elements of the E/M (history, physical exam, and MDM). Additional findings are as noted. I have personally seen and evaluated the patient. I find the patient's history and physical exam are consistent with the NP/PA documentation. I agree with the care provided, treatment rendered, disposition and follow-up plan. With diarrhea and hematuria recent flu shot patient denies abdominal pain diarrhea has had resolving hematuria is noted. Patient was strongly advised to have close follow-up with the urologist.  The patient does have an ongoing relationship with a urologist he was also given our clinic as an alternative  [address] M.D.   Attending Emergency  Physician            Latasha Peter MD  12/01/22 [address], [place]  12/01/22 2947

## 2022-12-01 NOTE — ED NOTES
This patient was assessed by the doctor only.  Nurse processed and completed the orders from the doctor ie labs, meds, and/or EKG       Delia Arizmendi RN  12/01/22 1218

## 2022-12-04 ASSESSMENT — ENCOUNTER SYMPTOMS
BLOOD IN STOOL: 0
VOMITING: 0
DIARRHEA: 1
SHORTNESS OF BREATH: 0
CONSTIPATION: 0
SORE THROAT: 0
ABDOMINAL PAIN: 0
TROUBLE SWALLOWING: 0
BACK PAIN: 0
NAUSEA: 0

## 2022-12-19 PROBLEM — R31.0 GROSS HEMATURIA: Status: ACTIVE | Noted: 2022-12-19

## 2023-01-30 NOTE — TELEPHONE ENCOUNTER
Pt has 2 days left; mail order and local pending.        Camilo mAaro is calling to request a refill on the following medication(s):    Medication Request:  Requested Prescriptions     Pending Prescriptions Disp Refills    metFORMIN (GLUCOPHAGE) 500 MG tablet 60 tablet 0     Sig: Take 1 tablet by mouth 2 times daily (with meals)    metFORMIN (GLUCOPHAGE) 500 MG tablet 180 tablet 1     Sig: Take 1 tablet by mouth 2 times daily (with meals)       Last Visit Date (If Applicable):  60/09/5142    Next Visit Date:    2/28/2023

## 2023-02-01 ENCOUNTER — HOSPITAL ENCOUNTER (OUTPATIENT)
Age: 73
Setting detail: SPECIMEN
Discharge: HOME | End: 2023-02-01

## 2023-02-01 ENCOUNTER — HOSPITAL ENCOUNTER (OUTPATIENT)
Dept: PREADMISSION TESTING | Age: 73
Discharge: HOME | End: 2023-02-05
Payer: MEDICARE

## 2023-02-01 VITALS
HEIGHT: 68 IN | TEMPERATURE: 97.7 F | HEART RATE: 70 BPM | SYSTOLIC BLOOD PRESSURE: 157 MMHG | OXYGEN SATURATION: 100 % | BODY MASS INDEX: 23.04 KG/M2 | DIASTOLIC BLOOD PRESSURE: 82 MMHG | WEIGHT: 152 LBS

## 2023-02-01 DIAGNOSIS — Z01.818 PRE-OP TESTING: Primary | ICD-10-CM

## 2023-02-01 DIAGNOSIS — Z01.818 PRE-OP TESTING: ICD-10-CM

## 2023-02-01 LAB
EKG ATRIAL RATE: 64 BPM
EKG P AXIS: 71 DEGREES
EKG P-R INTERVAL: 190 MS
EKG Q-T INTERVAL: 410 MS
EKG QRS DURATION: 84 MS
EKG QTC CALCULATION (BAZETT): 422 MS
EKG R AXIS: 36 DEGREES
EKG T AXIS: 128 DEGREES
EKG VENTRICULAR RATE: 64 BPM
HCT VFR BLD AUTO: 47.8 % (ref 40.7–50.3)
HGB BLD-MCNC: 15.6 G/DL (ref 13–17)
MCH RBC QN AUTO: 29.5 PG (ref 25.2–33.5)
MCHC RBC AUTO-ENTMCNC: 32.6 G/DL (ref 28.4–34.8)
MCV RBC AUTO: 90.5 FL (ref 82.6–102.9)
NRBC AUTOMATED: 0 PER 100 WBC
PDW BLD-RTO: 14.4 % (ref 11.8–14.4)
PLATELET # BLD AUTO: 174 K/UL (ref 138–453)
PMV BLD AUTO: 10.6 FL (ref 8.1–13.5)
RBC # BLD: 5.28 M/UL (ref 4.21–5.77)
WBC # BLD AUTO: 3.9 K/UL (ref 3.5–11.3)

## 2023-02-01 PROCEDURE — 93005 ELECTROCARDIOGRAM TRACING: CPT

## 2023-02-01 NOTE — DISCHARGE INSTRUCTIONS
DAY OF SURGERY/PROCEDURE  GUIDELINES    As a patient at the [facility], you can expect quality medical and nursing care that is centered on your individual needs. It is our goal to make your surgical experience as comfortable and excellent as possible.  ________________________________________________________________________    The following instructions are general guidelines, if any information on this sheet is different from what your doctor has instructed you to do, please follow your doctor's instructions. Please arrive on 2/15 @ 530      Enter through entrance C. Check in at registration     Upon arrival you will be taken to the pre-operative area to get ready for surgery, your family will stay in the waiting room and visit with you once you are ready for surgery. Due to special limitations please limit visitation to 1-2 members of your family at a time. When it is time for surgery your family will return to the waiting room. Nothing to eat, drink, smoke, suck or chew after midnight (no water, gum, mints, cigarettes, cigars, pipes, snuff, chewing tobacco, etc.) or your surgery may be canceled. Take a shower or bath on the morning of your surgery/procedure (Hibiclens if directed) Do not apply any lotions. Brush your teeth, but do not swallow any water    IN CASE OF ILLNESS - If you have a cold or flu symptoms (high fever, runny nose, sore throat, cough, etc.) rash, nausea, vomiting, loose stools, and/or recent contact with someone who has a contagious disease (chick pox, measles, etc.) please call your doctor before coming to the surgery center    If applicable bring your:  Inhaler (s)  Hearing aid(s)  Eyeglasses and Case (If you wear contacts they have to be removed before surgery, bring case and solution)  CPAP     DO NOT take anticoagulants (blood thinners, aspirin or aspirin-containing products) as instructed by your physician.     DO NOT take any diabetic pills or insulin morning of your surgery. Leave all jewelry at home and wear loose, comfortable clothing that is easy to put on and take off. If you will be returning home the same day as your surgery, you will need to have a responsible adult (25years of age or older) present to drive you home. You will need someone stay with you at home for the first 24 hours following your surgery. This is due to the anesthesia and the medication given to you during surgery and recovery.

## 2023-02-02 ENCOUNTER — ANESTHESIA EVENT (OUTPATIENT)
Dept: OPERATING ROOM | Age: 73
End: 2023-02-02
Payer: MEDICARE

## 2023-02-14 NOTE — ANESTHESIA PRE PROCEDURE
Department of Anesthesiology  Preprocedure Note       Name:  Kendy Matias   Age:  74 y.o.  :  1950                                          MRN:  [id number]         Date:  2023      Surgeon: Grant Borden):  Adalberto Bull MD    Procedure: Procedure(s):  SUPRAPUBIC PROSTATECTOMY LAPAROSCOPIC ROBOTIC WITH REMOVAL OF BLADDER CALCULI    Medications prior to admission:   Prior to Admission medications    Medication Sig Start Date End Date Taking? Authorizing Provider   metFORMIN (GLUCOPHAGE) 500 MG tablet Take 1 tablet by mouth 2 times daily (with meals) 23   Melissa Mo MD   finasteride (PROSCAR) 5 MG tablet Take 1 tablet by mouth daily 23   Adalberto Bull MD   atorvastatin (LIPITOR) 20 MG tablet TAKE 1 TABLET EVERY DAY 22   Melissa Mo MD   valsartan-hydroCHLOROthiazide (DIOVAN-HCT) 160-12.5 MG per tablet TAKE 1 TABLET EVERY DAY 22   Melissa Mo MD       Current medications:    No current facility-administered medications for this encounter.      Current Outpatient Medications   Medication Sig Dispense Refill    metFORMIN (GLUCOPHAGE) 500 MG tablet Take 1 tablet by mouth 2 times daily (with meals) 180 tablet 0    finasteride (PROSCAR) 5 MG tablet Take 1 tablet by mouth daily 90 tablet 3    atorvastatin (LIPITOR) 20 MG tablet TAKE 1 TABLET EVERY DAY 90 tablet 0    valsartan-hydroCHLOROthiazide (DIOVAN-HCT) 160-12.5 MG per tablet TAKE 1 TABLET EVERY DAY 90 tablet 1       Allergies:  No Known Allergies    Problem List:    Patient Active Problem List   Diagnosis Code    Essential hypertension I10    Elevated PSA R97.20    Erectile dysfunction due to arterial insufficiency N52.01    Family history of prostate cancer Z80.41    BPH with obstruction/lower urinary tract symptoms N40.1, N13.8    Adenomatous colon polyp D12.6    Dyslipidemia E78.5    Daily consumption of alcohol Z78.9    Renal insufficiency N28.9    Tetanus, diphtheria, and acellular pertussis (Tdap) vaccination declined Z28.21    Pneumococcal vaccination declined Z28.21    Type 2 diabetes, diet controlled ([facility] [address] E11.9    Type 2 diabetes mellitus with stage 2 chronic kidney disease, without long-term current use of insulin ([facility] [address] E11.22, N18.2    Gross hematuria R31.0       Past Medical History:        Diagnosis Date    Adenomatous colon polyp 07/2016    Caffeine use     2 coffee & 1 cola / day    History of colon polyps 07/2016    Hyperlipidemia     Hypertension     Pre-diabetes        Past Surgical History:        Procedure Laterality Date    COLONOSCOPY  07/15/2016    adenomatous polyp tubular type    PRE-MALIGNANT / BENIGN SKIN LESION EXCISION Left 2003    soft tissue mass on shoulder    PROSTATE BIOPSY  2016       Social History:    Social History     Tobacco Use    Smoking status: Never     Passive exposure: Past    Smokeless tobacco: Never   Substance Use Topics    Alcohol use: Yes     Alcohol/week: 2.0 standard drinks     Types: 2 Standard drinks or equivalent per week                                Counseling given: Not Answered      Vital Signs (Current): There were no vitals filed for this visit.                                            BP Readings from Last 3 Encounters:   02/01/23 (!) 157/82   12/19/22 (!) 172/99   12/01/22 (!) 185/92       NPO Status:                                                                                 BMI:   Wt Readings from Last 3 Encounters:   02/01/23 152 lb (68.9 kg)   12/19/22 156 lb (70.8 kg)   11/30/22 156 lb (70.8 kg)     There is no height or weight on file to calculate BMI.    CBC:   Lab Results   Component Value Date/Time    WBC 3.9 02/01/2023 10:00 AM    RBC 5.28 02/01/2023 10:00 AM    HGB 15.6 02/01/2023 10:00 AM    HCT 47.8 02/01/2023 10:00 AM    MCV 90.5 02/01/2023 10:00 AM    RDW 14.4 02/01/2023 10:00 AM     02/01/2023 10:00 AM       CMP:   Lab Results   Component Value Date/Time     11/30/2022 09:06 AM    K 4.7 11/30/2022 09:06 AM     11/30/2022 09:06 AM    CO2 25 11/30/2022 09:06 AM    BUN 19 11/30/2022 09:06 AM    CREATININE 1.06 11/30/2022 09:06 AM    GFRAA >60 05/13/2022 08:32 AM    LABGLOM >60 11/30/2022 09:06 AM    GLUCOSE 96 12/01/2022 03:02 PM    PROT 7.9 05/13/2022 08:32 AM    CALCIUM 9.6 11/30/2022 09:06 AM    BILITOT 0.26 05/13/2022 08:32 AM    ALKPHOS 90 05/13/2022 08:32 AM    AST 21 05/13/2022 08:32 AM    ALT 25 05/13/2022 08:32 AM       POC Tests: No results for input(s): POCGLU, POCNA, POCK, POCCL, POCBUN, POCHEMO, POCHCT in the last 72 hours. Coags: No results found for: PROTIME, INR, APTT    HCG (If Applicable): No results found for: PREGTESTUR, PREGSERUM, HCG, HCGQUANT     ABGs: No results found for: PHART, PO2ART, CQI7QJM, XIS3IZV, BEART, P6AWUHUL     Type & Screen (If Applicable):  No results found for: LABABO, LABRH    Drug/Infectious Status (If Applicable):  Lab Results   Component Value Date/Time    HEPCAB NONREACTIVE 06/01/2018 12:10 PM       COVID-19 Screening (If Applicable): No results found for: COVID19        Anesthesia Evaluation  Patient summary reviewed and Nursing notes reviewed no history of anesthetic complications:   Airway: Mallampati: II  TM distance: >3 FB   Neck ROM: full  Mouth opening: > = 3 FB   Dental: normal exam         Pulmonary:Negative Pulmonary ROS and normal exam  breath sounds clear to auscultation                             Cardiovascular:    (+) hypertension: no interval change, hyperlipidemia      ECG reviewed  Rhythm: regular  Rate: normal                    Neuro/Psych:   Negative Neuro/Psych ROS              GI/Hepatic/Renal:   (+) renal disease: CRI and no interval change,           Endo/Other:    (+) DiabetesType II DM, no interval change, , .                  ROS comment: BPH with urinary obstruction   Daily consumption of alcohol Abdominal:       Abdomen: soft. Vascular: negative vascular ROS.          Other Findings:           Anesthesia Plan      general     ASA 3     (GA and TAP block)  Induction: intravenous. Anesthetic plan and risks discussed with patient. Use of blood products discussed with patient whom consented to blood products. Plan discussed with CRNA.           Post-op pain plan if not by surgeon: single peripheral nerve block            Davdi Palafox MD   2/14/2023

## 2023-02-15 ENCOUNTER — ANESTHESIA (OUTPATIENT)
Dept: OPERATING ROOM | Age: 73
End: 2023-02-15
Payer: MEDICARE

## 2023-02-15 ENCOUNTER — HOSPITAL ENCOUNTER (OUTPATIENT)
Age: 73
Discharge: HOME | End: 2023-02-16
Attending: SPECIALIST | Admitting: HOSPITALIST
Payer: MEDICARE

## 2023-02-15 DIAGNOSIS — N21.0 BLADDER CALCULI: ICD-10-CM

## 2023-02-15 DIAGNOSIS — N40.1 BPH WITH URINARY OBSTRUCTION: ICD-10-CM

## 2023-02-15 DIAGNOSIS — N13.8 BPH WITH URINARY OBSTRUCTION: ICD-10-CM

## 2023-02-15 LAB
ANION GAP SERPL CALCULATED.3IONS-SCNC: 9 MMOL/L (ref 9–17)
BUN SERPL-MCNC: 11 MG/DL (ref 8–23)
CALCIUM SERPL-MCNC: 8.7 MG/DL (ref 8.6–10.4)
CHLORIDE SERPL-SCNC: 101 MMOL/L (ref 98–107)
CO2 SERPL-SCNC: 24 MMOL/L (ref 20–31)
CREAT SERPL-MCNC: 0.97 MG/DL (ref 0.7–1.2)
GFR SERPL CREATININE-BSD FRML MDRD: >60 ML/MIN/1.73M2
GLUCOSE SERPL-MCNC: 194 MG/DL (ref 70–99)
HCT VFR BLD AUTO: 42.7 % (ref 41–53)
HGB BLD-MCNC: 14.1 G/DL (ref 13.5–17.5)
POTASSIUM SERPL-SCNC: 4.1 MMOL/L (ref 3.7–5.3)
SODIUM SERPL-SCNC: 134 MMOL/L (ref 135–144)

## 2023-02-15 PROCEDURE — 3600000019 HC SURGERY ROBOT ADDTL 15MIN: Performed by: SPECIALIST

## 2023-02-15 PROCEDURE — 85014 HEMATOCRIT: CPT

## 2023-02-15 PROCEDURE — 85018 HEMOGLOBIN: CPT

## 2023-02-15 PROCEDURE — 2500000003 HC RX 250 WO HCPCS: Performed by: ANESTHESIOLOGY

## 2023-02-15 PROCEDURE — 6360000002 HC RX W HCPCS: Performed by: SPECIALIST

## 2023-02-15 PROCEDURE — 2580000003 HC RX 258: Performed by: ANESTHESIOLOGY

## 2023-02-15 PROCEDURE — 3700000001 HC ADD 15 MINUTES (ANESTHESIA): Performed by: SPECIALIST

## 2023-02-15 PROCEDURE — 2580000003 HC RX 258: Performed by: NURSE ANESTHETIST, CERTIFIED REGISTERED

## 2023-02-15 PROCEDURE — 6360000002 HC RX W HCPCS

## 2023-02-15 PROCEDURE — 94760 N-INVAS EAR/PLS OXIMETRY 1: CPT

## 2023-02-15 PROCEDURE — 36415 COLL VENOUS BLD VENIPUNCTURE: CPT

## 2023-02-15 PROCEDURE — 6370000000 HC RX 637 (ALT 250 FOR IP): Performed by: SPECIALIST

## 2023-02-15 PROCEDURE — 7100000001 HC PACU RECOVERY - ADDTL 15 MIN: Performed by: SPECIALIST

## 2023-02-15 PROCEDURE — 2580000003 HC RX 258: Performed by: SPECIALIST

## 2023-02-15 PROCEDURE — 3700000000 HC ANESTHESIA ATTENDED CARE: Performed by: SPECIALIST

## 2023-02-15 PROCEDURE — 94664 DEMO&/EVAL PT USE INHALER: CPT

## 2023-02-15 PROCEDURE — 6370000000 HC RX 637 (ALT 250 FOR IP)

## 2023-02-15 PROCEDURE — 2709999900 HC NON-CHARGEABLE SUPPLY: Performed by: SPECIALIST

## 2023-02-15 PROCEDURE — 7100000000 HC PACU RECOVERY - FIRST 15 MIN: Performed by: SPECIALIST

## 2023-02-15 PROCEDURE — 6360000002 HC RX W HCPCS: Performed by: NURSE ANESTHETIST, CERTIFIED REGISTERED

## 2023-02-15 PROCEDURE — 99221 1ST HOSP IP/OBS SF/LOW 40: CPT | Performed by: HOSPITALIST

## 2023-02-15 PROCEDURE — 3600000009 HC SURGERY ROBOT BASE: Performed by: SPECIALIST

## 2023-02-15 PROCEDURE — 64488 TAP BLOCK BI INJECTION: CPT | Performed by: ANESTHESIOLOGY

## 2023-02-15 PROCEDURE — 87086 URINE CULTURE/COLONY COUNT: CPT

## 2023-02-15 PROCEDURE — S2900 ROBOTIC SURGICAL SYSTEM: HCPCS | Performed by: SPECIALIST

## 2023-02-15 PROCEDURE — 2500000003 HC RX 250 WO HCPCS: Performed by: NURSE ANESTHETIST, CERTIFIED REGISTERED

## 2023-02-15 PROCEDURE — 80048 BASIC METABOLIC PNL TOTAL CA: CPT

## 2023-02-15 PROCEDURE — 88307 TISSUE EXAM BY PATHOLOGIST: CPT

## 2023-02-15 RX ORDER — OXYCODONE HYDROCHLORIDE 5 MG/1
5 TABLET ORAL PRN
Status: COMPLETED | OUTPATIENT
Start: 2023-02-15 | End: 2023-02-15

## 2023-02-15 RX ORDER — MORPHINE SULFATE 2 MG/ML
2 INJECTION, SOLUTION INTRAMUSCULAR; INTRAVENOUS
Status: DISCONTINUED | OUTPATIENT
Start: 2023-02-15 | End: 2023-02-16 | Stop reason: HOSPADM

## 2023-02-15 RX ORDER — SODIUM CHLORIDE 0.9 % (FLUSH) 0.9 %
5-40 SYRINGE (ML) INJECTION EVERY 12 HOURS SCHEDULED
Status: DISCONTINUED | OUTPATIENT
Start: 2023-02-15 | End: 2023-02-15 | Stop reason: HOSPADM

## 2023-02-15 RX ORDER — MIDAZOLAM HYDROCHLORIDE 1 MG/ML
INJECTION INTRAMUSCULAR; INTRAVENOUS
Status: COMPLETED
Start: 2023-02-15 | End: 2023-02-15

## 2023-02-15 RX ORDER — HYDROCHLOROTHIAZIDE 25 MG/1
12.5 TABLET ORAL DAILY
Status: DISCONTINUED | OUTPATIENT
Start: 2023-02-15 | End: 2023-02-15 | Stop reason: SDUPTHER

## 2023-02-15 RX ORDER — SODIUM CHLORIDE 0.9 % (FLUSH) 0.9 %
5-40 SYRINGE (ML) INJECTION PRN
Status: DISCONTINUED | OUTPATIENT
Start: 2023-02-15 | End: 2023-02-15 | Stop reason: HOSPADM

## 2023-02-15 RX ORDER — FENTANYL CITRATE 50 UG/ML
INJECTION, SOLUTION INTRAMUSCULAR; INTRAVENOUS PRN
Status: DISCONTINUED | OUTPATIENT
Start: 2023-02-15 | End: 2023-02-15 | Stop reason: SDUPTHER

## 2023-02-15 RX ORDER — ULTRASOUND COUPLING MEDIUM
GEL (GRAM) TOPICAL PRN
Status: DISCONTINUED | OUTPATIENT
Start: 2023-02-15 | End: 2023-02-15 | Stop reason: ALTCHOICE

## 2023-02-15 RX ORDER — ONDANSETRON 2 MG/ML
4 INJECTION INTRAMUSCULAR; INTRAVENOUS EVERY 6 HOURS PRN
Status: DISCONTINUED | OUTPATIENT
Start: 2023-02-15 | End: 2023-02-16 | Stop reason: HOSPADM

## 2023-02-15 RX ORDER — KETOROLAC TROMETHAMINE 30 MG/ML
INJECTION, SOLUTION INTRAMUSCULAR; INTRAVENOUS PRN
Status: DISCONTINUED | OUTPATIENT
Start: 2023-02-15 | End: 2023-02-15 | Stop reason: SDUPTHER

## 2023-02-15 RX ORDER — SODIUM CHLORIDE 9 MG/ML
INJECTION, SOLUTION INTRAVENOUS PRN
Status: DISCONTINUED | OUTPATIENT
Start: 2023-02-15 | End: 2023-02-15 | Stop reason: HOSPADM

## 2023-02-15 RX ORDER — PROPOFOL 10 MG/ML
INJECTION, EMULSION INTRAVENOUS PRN
Status: DISCONTINUED | OUTPATIENT
Start: 2023-02-15 | End: 2023-02-15 | Stop reason: SDUPTHER

## 2023-02-15 RX ORDER — SODIUM CHLORIDE, SODIUM LACTATE, POTASSIUM CHLORIDE, CALCIUM CHLORIDE 600; 310; 30; 20 MG/100ML; MG/100ML; MG/100ML; MG/100ML
INJECTION, SOLUTION INTRAVENOUS CONTINUOUS PRN
Status: DISCONTINUED | OUTPATIENT
Start: 2023-02-15 | End: 2023-02-15 | Stop reason: SDUPTHER

## 2023-02-15 RX ORDER — SODIUM CHLORIDE, SODIUM LACTATE, POTASSIUM CHLORIDE, CALCIUM CHLORIDE 600; 310; 30; 20 MG/100ML; MG/100ML; MG/100ML; MG/100ML
INJECTION, SOLUTION INTRAVENOUS CONTINUOUS
Status: DISCONTINUED | OUTPATIENT
Start: 2023-02-15 | End: 2023-02-16 | Stop reason: HOSPADM

## 2023-02-15 RX ORDER — PHENAZOPYRIDINE HYDROCHLORIDE 100 MG/1
200 TABLET, FILM COATED ORAL 3 TIMES DAILY PRN
Status: DISCONTINUED | OUTPATIENT
Start: 2023-02-15 | End: 2023-02-16 | Stop reason: HOSPADM

## 2023-02-15 RX ORDER — VALSARTAN AND HYDROCHLOROTHIAZIDE 160; 12.5 MG/1; MG/1
1 TABLET, FILM COATED ORAL DAILY
Status: DISCONTINUED | OUTPATIENT
Start: 2023-02-15 | End: 2023-02-16 | Stop reason: HOSPADM

## 2023-02-15 RX ORDER — OXYCODONE HYDROCHLORIDE 5 MG/1
10 TABLET ORAL PRN
Status: COMPLETED | OUTPATIENT
Start: 2023-02-15 | End: 2023-02-15

## 2023-02-15 RX ORDER — OXYCODONE HYDROCHLORIDE 5 MG/1
TABLET ORAL
Status: COMPLETED
Start: 2023-02-15 | End: 2023-02-15

## 2023-02-15 RX ORDER — OXYCODONE HYDROCHLORIDE 5 MG/1
10 TABLET ORAL EVERY 4 HOURS PRN
Status: DISCONTINUED | OUTPATIENT
Start: 2023-02-15 | End: 2023-02-16 | Stop reason: HOSPADM

## 2023-02-15 RX ORDER — MORPHINE SULFATE 2 MG/ML
1 INJECTION, SOLUTION INTRAMUSCULAR; INTRAVENOUS EVERY 5 MIN PRN
Status: DISCONTINUED | OUTPATIENT
Start: 2023-02-15 | End: 2023-02-15 | Stop reason: HOSPADM

## 2023-02-15 RX ORDER — SODIUM CHLORIDE 9 MG/ML
INJECTION, SOLUTION INTRAVENOUS PRN
Status: DISCONTINUED | OUTPATIENT
Start: 2023-02-15 | End: 2023-02-16 | Stop reason: HOSPADM

## 2023-02-15 RX ORDER — SODIUM CHLORIDE, SODIUM LACTATE, POTASSIUM CHLORIDE, CALCIUM CHLORIDE 600; 310; 30; 20 MG/100ML; MG/100ML; MG/100ML; MG/100ML
INJECTION, SOLUTION INTRAVENOUS CONTINUOUS
Status: DISCONTINUED | OUTPATIENT
Start: 2023-02-15 | End: 2023-02-15

## 2023-02-15 RX ORDER — SODIUM CHLORIDE 9 MG/ML
25 INJECTION, SOLUTION INTRAVENOUS PRN
Status: DISCONTINUED | OUTPATIENT
Start: 2023-02-15 | End: 2023-02-15 | Stop reason: HOSPADM

## 2023-02-15 RX ORDER — METOCLOPRAMIDE HYDROCHLORIDE 5 MG/ML
10 INJECTION INTRAMUSCULAR; INTRAVENOUS
Status: DISCONTINUED | OUTPATIENT
Start: 2023-02-15 | End: 2023-02-15 | Stop reason: HOSPADM

## 2023-02-15 RX ORDER — DEXAMETHASONE SODIUM PHOSPHATE 10 MG/ML
INJECTION, SOLUTION INTRAMUSCULAR; INTRAVENOUS PRN
Status: DISCONTINUED | OUTPATIENT
Start: 2023-02-15 | End: 2023-02-15 | Stop reason: SDUPTHER

## 2023-02-15 RX ORDER — HYDRALAZINE HYDROCHLORIDE 20 MG/ML
10 INJECTION INTRAMUSCULAR; INTRAVENOUS
Status: DISCONTINUED | OUTPATIENT
Start: 2023-02-15 | End: 2023-02-15 | Stop reason: HOSPADM

## 2023-02-15 RX ORDER — CEFAZOLIN 2 G/1
INJECTION, POWDER, FOR SOLUTION INTRAMUSCULAR; INTRAVENOUS
Status: DISCONTINUED
Start: 2023-02-15 | End: 2023-02-15

## 2023-02-15 RX ORDER — SODIUM CHLORIDE 0.9 % (FLUSH) 0.9 %
5-40 SYRINGE (ML) INJECTION PRN
Status: DISCONTINUED | OUTPATIENT
Start: 2023-02-15 | End: 2023-02-16 | Stop reason: HOSPADM

## 2023-02-15 RX ORDER — TROSPIUM CHLORIDE 20 MG/1
20 TABLET, FILM COATED ORAL
Status: DISCONTINUED | OUTPATIENT
Start: 2023-02-15 | End: 2023-02-16 | Stop reason: HOSPADM

## 2023-02-15 RX ORDER — BUPIVACAINE HYDROCHLORIDE 2.5 MG/ML
INJECTION, SOLUTION EPIDURAL; INFILTRATION; INTRACAUDAL
Status: COMPLETED | OUTPATIENT
Start: 2023-02-15 | End: 2023-02-15

## 2023-02-15 RX ORDER — ONDANSETRON 2 MG/ML
4 INJECTION INTRAMUSCULAR; INTRAVENOUS
Status: DISCONTINUED | OUTPATIENT
Start: 2023-02-15 | End: 2023-02-15 | Stop reason: HOSPADM

## 2023-02-15 RX ORDER — ROCURONIUM BROMIDE 10 MG/ML
INJECTION, SOLUTION INTRAVENOUS PRN
Status: DISCONTINUED | OUTPATIENT
Start: 2023-02-15 | End: 2023-02-15 | Stop reason: SDUPTHER

## 2023-02-15 RX ORDER — OXYCODONE HYDROCHLORIDE 5 MG/1
5 TABLET ORAL EVERY 4 HOURS PRN
Status: DISCONTINUED | OUTPATIENT
Start: 2023-02-15 | End: 2023-02-16 | Stop reason: HOSPADM

## 2023-02-15 RX ORDER — MORPHINE SULFATE 4 MG/ML
4 INJECTION, SOLUTION INTRAMUSCULAR; INTRAVENOUS
Status: DISCONTINUED | OUTPATIENT
Start: 2023-02-15 | End: 2023-02-16 | Stop reason: HOSPADM

## 2023-02-15 RX ORDER — LIDOCAINE HYDROCHLORIDE 10 MG/ML
INJECTION, SOLUTION INFILTRATION; PERINEURAL PRN
Status: DISCONTINUED | OUTPATIENT
Start: 2023-02-15 | End: 2023-02-15 | Stop reason: SDUPTHER

## 2023-02-15 RX ORDER — VALSARTAN 160 MG/1
160 TABLET ORAL DAILY
Status: DISCONTINUED | OUTPATIENT
Start: 2023-02-15 | End: 2023-02-15 | Stop reason: SDUPTHER

## 2023-02-15 RX ORDER — DEXAMETHASONE SODIUM PHOSPHATE 10 MG/ML
INJECTION, SOLUTION INTRAMUSCULAR; INTRAVENOUS
Status: DISCONTINUED
Start: 2023-02-15 | End: 2023-02-15

## 2023-02-15 RX ORDER — GLYCOPYRROLATE 0.2 MG/ML
INJECTION INTRAMUSCULAR; INTRAVENOUS PRN
Status: DISCONTINUED | OUTPATIENT
Start: 2023-02-15 | End: 2023-02-15 | Stop reason: SDUPTHER

## 2023-02-15 RX ORDER — FENTANYL CITRATE 50 UG/ML
INJECTION, SOLUTION INTRAMUSCULAR; INTRAVENOUS
Status: COMPLETED
Start: 2023-02-15 | End: 2023-02-15

## 2023-02-15 RX ORDER — ACETAMINOPHEN 325 MG/1
650 TABLET ORAL EVERY 6 HOURS SCHEDULED
Status: DISCONTINUED | OUTPATIENT
Start: 2023-02-15 | End: 2023-02-16 | Stop reason: HOSPADM

## 2023-02-15 RX ORDER — FINASTERIDE 5 MG/1
5 TABLET, FILM COATED ORAL DAILY
Status: DISCONTINUED | OUTPATIENT
Start: 2023-02-15 | End: 2023-02-16 | Stop reason: HOSPADM

## 2023-02-15 RX ORDER — BUPIVACAINE HYDROCHLORIDE 2.5 MG/ML
INJECTION, SOLUTION INFILTRATION; PERINEURAL
Status: COMPLETED
Start: 2023-02-15 | End: 2023-02-15

## 2023-02-15 RX ORDER — ONDANSETRON 2 MG/ML
INJECTION INTRAMUSCULAR; INTRAVENOUS PRN
Status: DISCONTINUED | OUTPATIENT
Start: 2023-02-15 | End: 2023-02-15 | Stop reason: SDUPTHER

## 2023-02-15 RX ORDER — MIDAZOLAM HYDROCHLORIDE 1 MG/ML
INJECTION INTRAMUSCULAR; INTRAVENOUS PRN
Status: DISCONTINUED | OUTPATIENT
Start: 2023-02-15 | End: 2023-02-15 | Stop reason: SDUPTHER

## 2023-02-15 RX ORDER — PHENYLEPHRINE HCL IN 0.9% NACL 1 MG/10 ML
SYRINGE (ML) INTRAVENOUS PRN
Status: DISCONTINUED | OUTPATIENT
Start: 2023-02-15 | End: 2023-02-15 | Stop reason: SDUPTHER

## 2023-02-15 RX ORDER — DIPHENHYDRAMINE HYDROCHLORIDE 50 MG/ML
12.5 INJECTION INTRAMUSCULAR; INTRAVENOUS
Status: DISCONTINUED | OUTPATIENT
Start: 2023-02-15 | End: 2023-02-15 | Stop reason: HOSPADM

## 2023-02-15 RX ORDER — SODIUM CHLORIDE 0.9 % (FLUSH) 0.9 %
5-40 SYRINGE (ML) INJECTION EVERY 12 HOURS SCHEDULED
Status: DISCONTINUED | OUTPATIENT
Start: 2023-02-15 | End: 2023-02-16 | Stop reason: HOSPADM

## 2023-02-15 RX ORDER — ATORVASTATIN CALCIUM 20 MG/1
20 TABLET, FILM COATED ORAL DAILY
Status: DISCONTINUED | OUTPATIENT
Start: 2023-02-15 | End: 2023-02-16 | Stop reason: HOSPADM

## 2023-02-15 RX ORDER — VALSARTAN AND HYDROCHLOROTHIAZIDE 160; 12.5 MG/1; MG/1
1 TABLET, FILM COATED ORAL DAILY
Status: DISCONTINUED | OUTPATIENT
Start: 2023-02-15 | End: 2023-02-15 | Stop reason: CLARIF

## 2023-02-15 RX ORDER — NEOSTIGMINE METHYLSULFATE 5 MG/5 ML
SYRINGE (ML) INTRAVENOUS PRN
Status: DISCONTINUED | OUTPATIENT
Start: 2023-02-15 | End: 2023-02-15 | Stop reason: SDUPTHER

## 2023-02-15 RX ADMIN — ATORVASTATIN CALCIUM 20 MG: 20 TABLET, FILM COATED ORAL at 14:17

## 2023-02-15 RX ADMIN — SODIUM CHLORIDE, POTASSIUM CHLORIDE, SODIUM LACTATE AND CALCIUM CHLORIDE: 600; 310; 30; 20 INJECTION, SOLUTION INTRAVENOUS at 10:38

## 2023-02-15 RX ADMIN — KETOROLAC TROMETHAMINE 15 MG: 30 INJECTION, SOLUTION INTRAMUSCULAR; INTRAVENOUS at 10:10

## 2023-02-15 RX ADMIN — CEFAZOLIN 2000 MG: 2 INJECTION, POWDER, FOR SOLUTION INTRAMUSCULAR; INTRAVENOUS at 17:47

## 2023-02-15 RX ADMIN — HYDROMORPHONE HYDROCHLORIDE 0.5 MG: 1 INJECTION, SOLUTION INTRAMUSCULAR; INTRAVENOUS; SUBCUTANEOUS at 09:29

## 2023-02-15 RX ADMIN — ROCURONIUM BROMIDE 50 MG: 10 INJECTION, SOLUTION INTRAVENOUS at 07:34

## 2023-02-15 RX ADMIN — FENTANYL CITRATE 50 MCG: 50 INJECTION, SOLUTION INTRAMUSCULAR; INTRAVENOUS at 07:05

## 2023-02-15 RX ADMIN — Medication 200 MCG: at 07:43

## 2023-02-15 RX ADMIN — CEFAZOLIN 2000 MG: 2 INJECTION, POWDER, FOR SOLUTION INTRAMUSCULAR; INTRAVENOUS at 07:39

## 2023-02-15 RX ADMIN — GLYCOPYRROLATE 0.5 MG: 0.2 INJECTION INTRAMUSCULAR; INTRAVENOUS at 10:14

## 2023-02-15 RX ADMIN — SODIUM CHLORIDE, POTASSIUM CHLORIDE, SODIUM LACTATE AND CALCIUM CHLORIDE: 600; 310; 30; 20 INJECTION, SOLUTION INTRAVENOUS at 07:24

## 2023-02-15 RX ADMIN — Medication 0.5 MG: at 10:41

## 2023-02-15 RX ADMIN — ACETAMINOPHEN 650 MG: 325 TABLET ORAL at 23:47

## 2023-02-15 RX ADMIN — MIDAZOLAM 2 MG: 1 INJECTION INTRAMUSCULAR; INTRAVENOUS at 07:05

## 2023-02-15 RX ADMIN — FINASTERIDE 5 MG: 5 TABLET, FILM COATED ORAL at 14:17

## 2023-02-15 RX ADMIN — VALSARTAN AND HYDROCHLOROTHIAZIDE 1 TABLET: 160; 12.5 TABLET, FILM COATED ORAL at 14:16

## 2023-02-15 RX ADMIN — Medication 5 MG: at 10:14

## 2023-02-15 RX ADMIN — ONDANSETRON 4 MG: 2 INJECTION INTRAMUSCULAR; INTRAVENOUS at 10:10

## 2023-02-15 RX ADMIN — OXYCODONE HYDROCHLORIDE 5 MG: 5 TABLET ORAL at 11:25

## 2023-02-15 RX ADMIN — SODIUM CHLORIDE: 9 INJECTION, SOLUTION INTRAVENOUS at 17:47

## 2023-02-15 RX ADMIN — GLYCOPYRROLATE 0.2 MG: 0.2 INJECTION INTRAMUSCULAR; INTRAVENOUS at 07:43

## 2023-02-15 RX ADMIN — BUPIVACAINE HYDROCHLORIDE 45 ML: 2.5 INJECTION, SOLUTION EPIDURAL; INFILTRATION; INTRACAUDAL; PERINEURAL at 07:09

## 2023-02-15 RX ADMIN — HYDROMORPHONE HYDROCHLORIDE 0.5 MG: 1 INJECTION, SOLUTION INTRAMUSCULAR; INTRAVENOUS; SUBCUTANEOUS at 10:54

## 2023-02-15 RX ADMIN — HYDROMORPHONE HYDROCHLORIDE 0.5 MG: 1 INJECTION, SOLUTION INTRAMUSCULAR; INTRAVENOUS; SUBCUTANEOUS at 09:47

## 2023-02-15 RX ADMIN — SODIUM CHLORIDE, POTASSIUM CHLORIDE, SODIUM LACTATE AND CALCIUM CHLORIDE: 600; 310; 30; 20 INJECTION, SOLUTION INTRAVENOUS at 12:37

## 2023-02-15 RX ADMIN — TROSPIUM CHLORIDE 20 MG: 20 TABLET, FILM COATED ORAL at 14:20

## 2023-02-15 RX ADMIN — FENTANYL CITRATE 100 MCG: 50 INJECTION, SOLUTION INTRAMUSCULAR; INTRAVENOUS at 07:27

## 2023-02-15 RX ADMIN — ACETAMINOPHEN 650 MG: 325 TABLET ORAL at 14:16

## 2023-02-15 RX ADMIN — Medication 0.5 MG: at 10:54

## 2023-02-15 RX ADMIN — SODIUM CHLORIDE, POTASSIUM CHLORIDE, SODIUM LACTATE AND CALCIUM CHLORIDE: 600; 310; 30; 20 INJECTION, SOLUTION INTRAVENOUS at 20:53

## 2023-02-15 RX ADMIN — LIDOCAINE HYDROCHLORIDE 60 MG: 10 INJECTION, SOLUTION INFILTRATION; PERINEURAL at 07:34

## 2023-02-15 RX ADMIN — CEFAZOLIN 2000 MG: 2 INJECTION, POWDER, FOR SOLUTION INTRAMUSCULAR; INTRAVENOUS at 23:45

## 2023-02-15 RX ADMIN — Medication 100 MCG: at 07:55

## 2023-02-15 RX ADMIN — PROPOFOL 150 MG: 10 INJECTION, EMULSION INTRAVENOUS at 07:34

## 2023-02-15 RX ADMIN — MIDAZOLAM 2 MG: 1 INJECTION INTRAMUSCULAR; INTRAVENOUS at 07:27

## 2023-02-15 RX ADMIN — ACETAMINOPHEN 650 MG: 325 TABLET ORAL at 20:49

## 2023-02-15 RX ADMIN — ROCURONIUM BROMIDE 20 MG: 10 INJECTION, SOLUTION INTRAVENOUS at 08:05

## 2023-02-15 RX ADMIN — HYDROMORPHONE HYDROCHLORIDE 0.5 MG: 1 INJECTION, SOLUTION INTRAMUSCULAR; INTRAVENOUS; SUBCUTANEOUS at 10:41

## 2023-02-15 RX ADMIN — DEXAMETHASONE SODIUM PHOSPHATE 10 MG: 10 INJECTION INTRAMUSCULAR; INTRAVENOUS at 07:42

## 2023-02-15 RX ADMIN — SODIUM CHLORIDE, POTASSIUM CHLORIDE, SODIUM LACTATE AND CALCIUM CHLORIDE: 600; 310; 30; 20 INJECTION, SOLUTION INTRAVENOUS at 06:16

## 2023-02-15 RX ADMIN — ROCURONIUM BROMIDE 20 MG: 10 INJECTION, SOLUTION INTRAVENOUS at 08:47

## 2023-02-15 RX ADMIN — TROSPIUM CHLORIDE 20 MG: 20 TABLET, FILM COATED ORAL at 17:48

## 2023-02-15 ASSESSMENT — PAIN SCALES - GENERAL
PAINLEVEL_OUTOF10: 4
PAINLEVEL_OUTOF10: 9
PAINLEVEL_OUTOF10: 0
PAINLEVEL_OUTOF10: 4
PAINLEVEL_OUTOF10: 0
PAINLEVEL_OUTOF10: 7
PAINLEVEL_OUTOF10: 5
PAINLEVEL_OUTOF10: 3
PAINLEVEL_OUTOF10: 4

## 2023-02-15 ASSESSMENT — PAIN - FUNCTIONAL ASSESSMENT
PAIN_FUNCTIONAL_ASSESSMENT: 0-10
PAIN_FUNCTIONAL_ASSESSMENT: ACTIVITIES ARE NOT PREVENTED
PAIN_FUNCTIONAL_ASSESSMENT: ACTIVITIES ARE NOT PREVENTED

## 2023-02-15 ASSESSMENT — PAIN DESCRIPTION - DESCRIPTORS
DESCRIPTORS: ACHING
DESCRIPTORS: ACHING;PRESSURE
DESCRIPTORS: ACHING
DESCRIPTORS: BURNING
DESCRIPTORS: ACHING
DESCRIPTORS: ACHING;PRESSURE
DESCRIPTORS: BURNING

## 2023-02-15 ASSESSMENT — PAIN DESCRIPTION - LOCATION
LOCATION: PENIS
LOCATION: ABDOMEN
LOCATION: ABDOMEN
LOCATION: PENIS
LOCATION: ABDOMEN

## 2023-02-15 ASSESSMENT — PAIN SCALES - WONG BAKER: WONGBAKER_NUMERICALRESPONSE: 0

## 2023-02-15 NOTE — H&P
Therese Gibbs MD [facility]    History and Physical    Patient:  Kalyn Real  MRN: [id number]  YOB: 1950    CHIEF COMPLAINT:  BPH with obstruction and bladder calculi    HISTORY OF PRESENT ILLNESS:   The patient is a 74 y.o. male who presents with:  gross hematuria and an Elevated PSA. His 1/3/23 prostate MRI shows a 108 mL prostate and numerous small bladder calculi. There was no evidence of clinically significant prostate cancer. His CT urogram confirms BPH with obstruction and numerous bladder calculi. Will begin Finasteride 5 mg po qd for BPH symptoms. He will need a Robotic assisted (Patricia Sheikh) suprapubic prostatectomy and removal of bladder calculi under GA. Last AUA Symptom Score (QOL): 7 (2)  Today's AUA Symptom Score (QOL): 1 (2)     Summary of old records:   Gross hematuria, 12/1/22; 1/2/23 CT urogram: BPH and multiple small bladder calculi; needs a Robotic assisted (Patricia Sheikh) suprapubic prostatectomy  Elevated PSA of 6.10 on 12/1/15; bx 2/1/16 neg (78.70 mL); 1/3/23 MRI (108 mL, PIRADS 2, PSA density 0.13)  BPH, very large on MELINDA  FmHx prostate cancer in father, age [de-identified]    Patient's old records, notes and chart reviewed and summarized above. Past Medical History:    Past Medical History:   Diagnosis Date    Adenomatous colon polyp 07/2016    Caffeine use     2 coffee & 1 cola / day    History of colon polyps 07/2016    Hyperlipidemia     Hypertension     Pre-diabetes        Past Surgical History:    Past Surgical History:   Procedure Laterality Date    COLONOSCOPY  07/15/2016    adenomatous polyp tubular type    PRE-MALIGNANT / BENIGN SKIN LESION EXCISION Left 2003    soft tissue mass on shoulder    PROSTATE BIOPSY  2016       Medications Prior to Admission:    Prior to Admission medications    Medication Sig Start Date End Date Taking?  Authorizing Provider   metFORMIN (GLUCOPHAGE) 500 MG tablet Take 1 tablet by mouth 2 times daily (with meals) 1/30/23   Adarsh Chun MD finasteride (PROSCAR) 5 MG tablet Take 1 tablet by mouth daily 1/12/23   Bonnie Zarco MD   atorvastatin (LIPITOR) 20 MG tablet TAKE 1 TABLET EVERY DAY 11/2/22   David Guidry MD   valsartan-hydroCHLOROthiazide (DIOVAN-HCT) 160-12.5 MG per tablet TAKE 1 TABLET EVERY DAY 7/21/22   David Guidry MD       Allergies:  Patient has no known allergies. Social History:    Social History     Socioeconomic History    Marital status:      Spouse name: Not on file    Number of children: 4    Years of education: Not on file    Highest education level: Not on file   Occupational History    Occupation: Retired   Tobacco Use    Smoking status: Never     Passive exposure: Past    Smokeless tobacco: Never   Vaping Use    Vaping Use: Never used   Substance and Sexual Activity    Alcohol use: Yes     Alcohol/week: 2.0 standard drinks     Types: 2 Standard drinks or equivalent per week    Drug use: No    Sexual activity: Yes   Other Topics Concern    Not on file   Social History Narrative    Not on file     Social Determinants of Health     Financial Resource Strain: Not on file   Food Insecurity: Not on file   Transportation Needs: Not on file   Physical Activity: Sufficiently Active    Days of Exercise per Week: 7 days    Minutes of Exercise per Session: 30 min   Stress: Not on file   Social Connections: Not on file   Intimate Partner Violence: Not on file   Housing Stability: Not on file       Family History:    Family History   Problem Relation Age of Onset    Heart Attack Mother     Prostate Cancer Father        REVIEW OF SYSTEMS:  All reviewed and negative except for pertinent ones listed in HPI. Physical Exam:      Patient Vitals for the past 24 hrs:   BP Temp Temp src Pulse Resp SpO2 Height Weight   02/15/23 0604 (!) 163/69 -- -- -- -- -- -- --   02/15/23 0601 (!) 171/81 (!) 96.6 °F (35.9 °C) Infrared 80 16 100 % 5' 8\" (1.727 m) 149 lb (67.6 kg)     Constitutional: Patient in no acute distress;    Neuro: alert and oriented to person place and time. Psych: Mood and affect normal.  Skin: Normal  Lungs: Respiratory effort normal, CTA  Cardiovascular:  Normal peripheral pulses; R3 wo murmur  Abdomen: Soft, non-tender, non-distended with no CVA, flank pain    LABS:   No results for input(s): WBC, HGB, HCT, MCV, PLT in the last 72 hours. No results for input(s): NA, K, CL, CO2, PHOS, BUN, CREATININE, CA in the last 72 hours. Lab Results   Component Value Date    PSA 13.57 12/20/2022    PSA 11.38 (H) 12/15/2021    PSA 7.40 (H) 06/01/2018       Additional Lab/culture results:    Urinalysis: No results for input(s): COLORU, PHUR, LABCAST, WBCUA, RBCUA, MUCUS, TRICHOMONAS, YEAST, BACTERIA, CLARITYU, SPECGRAV, LEUKOCYTESUR, UROBILINOGEN, BILIRUBINUR, BLOODU in the last 72 hours. Invalid input(s): NITRATE, GLUCOSEUKETONESUAMORPHOUS     -----------------------------------------------------------------  Imaging Results:    Assessment and Plan   Impression:    Patient Active Problem List   Diagnosis    Essential hypertension    Elevated PSA    Erectile dysfunction due to arterial insufficiency    Family history of prostate cancer    BPH with obstruction/lower urinary tract symptoms    Adenomatous colon polyp    Dyslipidemia    Daily consumption of alcohol    Renal insufficiency    Tetanus, diphtheria, and acellular pertussis (Tdap) vaccination declined    Pneumococcal vaccination declined    Type 2 diabetes, diet controlled ([address]    Type 2 diabetes mellitus with stage 2 chronic kidney disease, without long-term current use of insulin ([address]    Gross hematuria       Plan: Robotic assisted (Meryl Lares) suprapubic prostatectomy and removal of bladder calculi under GA.     Joanne Cardoza MD  6:18 AM 2/15/2023

## 2023-02-15 NOTE — ANESTHESIA POSTPROCEDURE EVALUATION
Department of Anesthesiology  Postprocedure Note    Patient: Cecile Yoon  MRN: [id number]  [place]: 1950  Date of evaluation: 2/15/2023      Procedure Summary     Date: 02/15/23 Room / Location: [address]    Anesthesia Start: 5840 Anesthesia Stop: 3582    Procedure: SUPRAPUBIC PROSTATECTOMY LAPAROSCOPIC ROBOTIC WITH REMOVAL OF BLADDER CALCULI Diagnosis:       Bladder calculi      BPH with urinary obstruction      (Bladder calculi [N21.0])      (BPH with urinary obstruction [N40.1, N13.8])    Surgeons: Leland Castillo MD Responsible Provider: Juvenal Ram MD    Anesthesia Type: general ASA Status: 3          Anesthesia Type: No value filed.     Rosi Phase I: Rosi Score: 7    Rosi Phase II:        Anesthesia Post Evaluation    Patient location during evaluation: PACU  Patient participation: complete - patient participated  Level of consciousness: awake and alert  Pain score: 4  Airway patency: patent  Nausea & Vomiting: no nausea and no vomiting  Complications: no  Cardiovascular status: blood pressure returned to baseline  Respiratory status: acceptable and room air  Hydration status: euvolemic

## 2023-02-15 NOTE — ANESTHESIA PROCEDURE NOTES
Peripheral Block    Patient location during procedure: pre-op  Reason for block: post-op pain management and at surgeon's request  Start time: 2/15/2023 7:09 AM  End time: 2/15/2023 7:11 AM  Staffing  Performed: anesthesiologist   Anesthesiologist: Deisy Moser MD  Preanesthetic Checklist  Completed: patient identified, IV checked, site marked, risks and benefits discussed, surgical/procedural consents, equipment checked, pre-op evaluation, timeout performed, anesthesia consent given, oxygen available, monitors applied/VS acknowledged, fire risk safety assessment completed and verbalized and blood product R/B/A discussed and consented  Peripheral Block   Patient position: supine  Prep: ChloraPrep  Provider prep: mask and sterile gloves  Patient monitoring: cardiac monitor, continuous pulse ox, frequent blood pressure checks, IV access, oxygen and responsive to questions  Block type: TAP  Laterality: bilateral  Injection technique: single-shot  Guidance: ultrasound guided  Local infiltration: bupivacaine  Infiltration strength: 0.25 %  Local infiltration: bupivacaine  Dose: 4 mL    Needle   Needle type: insulated echogenic nerve stimulator needle   Needle localization: anatomical landmarks and ultrasound guidance  Needle insertion depth: 4 cm  Test dose: negative  Needle length: 10 cm  Assessment   Injection assessment: negative aspiration for heme, no paresthesia on injection, local visualized surrounding nerve on ultrasound and no intravascular symptoms  Paresthesia pain: none  Slow fractionated injection: yes  Hemodynamics: stable  Outcomes: uncomplicated and patient tolerated procedure well    Medications Administered  bupivacaine (PF) 0.25 % - Perineural   45 mL - 2/15/2023 7:09:00 AM  dexamethasone 4 MG/ML - Perineural   10 mg - 2/15/2023 7:09:00 AM

## 2023-02-15 NOTE — OP NOTE
Operative Note      Patient: Benton Vera  YOB: 1950  MRN: [id number]    Date of Procedure: 2/15/2023    Pre-Op Diagnosis: Bladder calculi [N21.0]  BPH with urinary obstruction [N40.1, N13.8]    Post-Op Diagnosis: Same       Procedure(s):  SUPRAPUBIC PROSTATECTOMY LAPAROSCOPIC ROBOTIC WITH REMOVAL OF BLADDER CALCULI    Surgeon(s):  Abbey Lopez MD    Assistant:   * No surgical staff found *    Anesthesia: General    Estimated Blood Loss (mL): less than 50     Complications: None    Specimens:   * No specimens in log *    Implants:  * No implants in log *      Drains: * No LDAs found *    Findings: see below     Detailed Description of Procedure: Indications:  Benton Vera is a 74 y.o. male who was found to have significant lower urinary tract symptoms due to BPH with obstruction and a prostate volume of 108 mL and multiple bladder calculi. All the options were presented to the patient. The patient decided to proceed Robotic assisted laparoscopic suprapubic/simple prostatectomy with removal of bladder calculi. All the risks and benefits were explained to the patient, informed consent was obtained. Patient received Ancef 2 gm IV on call to the OR.  EPC's were placed for VTE prophylaxis. DESCRIPTION OF PROCEDURE:  The patient was brought back to the operating room and he was positioned in the modified dorsal lithotomy position after general anesthesia was started. He was sterilely prepped and draped in standard fashion. All the pressure points were padded. A 20-French Tellez catheter was placed and the bladder drained and this was hooked up to a drainage bag. An incision was made in supraumbilical region and a Veress needle was placed through this into the peritoneum, and pneumoperitoneum was obtained. The rest of the ports were placed in the usual fashion.  The 3 robotic ports were placed which were 8 mm ports and 2 assistant ports were placed, one 12 mm port and one 5 mm port. The patient was placed in Trendelenburg position and the robot was docked and the procedure started by identifying the bladder. The bladder was filled through the catheter and an incision was made into the bladder in a horizontal fashion along the posterior aspect of the bladder. The bladder was entered and the blood clots were irrigated out of the bladder laparoscopically. Two 2-0 Nylon sutures on Barry needles with a Weck clip and Lapra-Ty at the end of the suture were used to retract the bladder open anteriorly. The Ness needles were placed through the anterior abdominal wall and placed on tension on the outside of the body with hemostats. Using the 10 mm suction device, we removed all bladder stones (>100) that were present. We then identified the catheter and identified the prostate, along with the ureteral orifices. After this, we incised the mucosa of the bladder and we began to dissect the plane between the adenoma of the prostate and the capsule of the prostate. This was done circumferentially making to stay away from the ureteral orifices. We carefully dissected this plane all the way around the prostate to core out the adenoma. Tenaculum was used on the 4th arm which helped with traction and we also fulgurated prostatic vessels along the way. This plane continued all the way around the prostate posteriorly, laterally and anteriorly especially towards the apex of the prostate. We made sure not to injure surrounding tissues. At the apex of the prostate we stayed close to the prostate and as we approached the urethra, we bivalved the prostate adenoma and we removed one--half to better allow visualization then we dissected the rest of the adenoma off and then we placed both specimens into Two separate Endo-Catch bags and we then irrigated the area. We made sure there was adequate hemostasis. We irrigated the area again and made sure there was adequate hemostasis.  After this was all done and ureteral orifices were intact, there is adequate hemostasis. We then placed a new 24 [place] 3-way Tellez catheter into the bladder. Then we closed the bladder in 2 layers with 3-0 (mucosa-muscle) and 2-0 (muscle-serosa) V-Loc stitches in a running fashion. After this we filled the bladder and made sure there was no leakage. Upon closure, we realized the two Weck clips and Lapra-Tys were still in the bladder. A small cystotomy was made to remove these and the small cystotomy was closed as described above and was watertight when the bladder was filled to 120 mL. We then undocked the robot. We elongated the umbilical skin incision. We removed the specimen bags through this  incision. This was set as permanent specimen to pathology. We closed this incision with figure-of-8 stitches of 0 Vicryl, and all the skin incisions were closed with 4-0 Monocryl. A KIM drain was placed through the 4th robotic arm port. This was secured to the skin with 3-0 nylon and all the skin incisions were closed with 4-0 Monocryl with Dermabond and that was the end of the procedure. The patient will be admitted for routine  postoperative care.      Electronically signed by Tori Lindo MD on 2/15/2023 at 7:05 AM

## 2023-02-15 NOTE — CONSULTS
[facility]  Office: [address], DO, Latoya Thakkar, DO, Jennifer Tomlin, DO, Jer Charles, DO, Blanca Johnson MD, Rachel Dior MD, Kennis Osler, MD, Yuridia Blakely MD,  Dewayne Moreno MD, Dia Pablo MD, Lázaro Francois, DO, Jen Chi MD,  Didier Long MD, Haylee Castro MD, Elvira Pastor, DO, Rick Daigle MD, Mali Denny MD, Mya Block, DO, Joanie Hinkle MD, Digna Prado MD, Casper Hylton MD, Yudi Kaur MD, Connor Blackwell, DO, Anay Lewis MD, Tez Hinkle MD, Whitney Jade, CNP,  Lele Strauss, CNP, Juan Carlos Amaya, CNP, Tom Mckenzie, CNP,  Lg Franco, [facility], Natalio Ortiz, CNP, Jaye Prasad, CNP, Sylvia Flaherty, CNP, Deneen Massey, CNP, Rajat Fitzpatrick, CNP, Giselle Hutchison PAAbigailC, Nacho Shi, CNS, Feliciano Quesada, CNP, Elaina Otero, CNP         [address] / HISTORY AND PHYSICAL EXAMINATION            Date:   2/15/2023  Patient name:  Chela Garcia  Date of admission:  2/15/2023  5:32 AM  MRN:   [id number]  Account:  [de-identified]  YOB: 1950  PCP:    Mal Aschoff, MD  Room:   [facility]/327-  Code Status:    Full Code    Physician Requesting Consult: Munir Warren MD    Reason for Consult: Diabetes, essential hypertension, hyperlipidemia    Chief Complaint:     \"I feel fine\"    History Obtained From:     patient, electronic medical record    History of Present Illness: This very pleasant 66-year-old male has been found to have benign prostate hyperplasia with obstruction and associated bladder calculi. The patient has undergone suprapubic prostatectomy along with removal of his bladder stones. He is postop day #0 and doing quite well. His  including his daughter at the bedside. Multiple questions answered. The patient does not have any pain at this point in time.   I did review his medications and he does have Roxicodone for pain. Multiple questions answered regarding pain control. Home medications have been resumed and presently he is hemodynamically stable. Providing he continues to do well it is anticipated he may be discharged home tomorrow. I did have a long discussion regarding the Tellez catheter and the blood within the bag. They understand that this is normal and that will be monitored. I do understand that it is not abnormal to see blood following this particular surgery for quite some time. Both patient as well as family are appreciative of the information. Past Medical History:     Past Medical History:   Diagnosis Date    Adenomatous colon polyp 07/2016    Caffeine use     2 coffee & 1 cola / day    History of colon polyps 07/2016    Hyperlipidemia     Hypertension     Pre-diabetes         Past Surgical History:     Past Surgical History:   Procedure Laterality Date    COLONOSCOPY  07/15/2016    adenomatous polyp tubular type    PRE-MALIGNANT / BENIGN SKIN LESION EXCISION Left 2003    soft tissue mass on shoulder    PROSTATE BIOPSY  2016    PROSTATE SURGERY  02/15/2023    SUPRAPUBIC PARTIAL PROSTATECTOMY LAPAROSCOPIC ROBOTIC WITH REMOVAL OF BLADDER CALCULI        Medications Prior to Admission:     Prior to Admission medications    Medication Sig Start Date End Date Taking? Authorizing Provider   metFORMIN (GLUCOPHAGE) 500 MG tablet Take 1 tablet by mouth 2 times daily (with meals) 1/30/23   Adarsh Chun MD   finasteride (PROSCAR) 5 MG tablet Take 1 tablet by mouth daily 1/12/23   Shanique Atkins MD   atorvastatin (LIPITOR) 20 MG tablet TAKE 1 TABLET EVERY DAY 11/2/22   Adarsh Chun MD   valsartan-hydroCHLOROthiazide (DIOVAN-HCT) 160-12.5 MG per tablet TAKE 1 TABLET EVERY DAY 7/21/22   Adarsh Chun MD        Allergies:     Patient has no known allergies. Social History:     Tobacco:    reports that he has never smoked. He has been exposed to tobacco smoke.  He has never used smokeless tobacco.  Alcohol:      reports current alcohol use of about 2.0 standard drinks per week. Drug Use:  reports no history of drug use. Family History:     Family History   Problem Relation Age of Onset    Heart Attack Mother     Prostate Cancer Father        Review of Systems:     Positive and Negative as described in HPI. CONSTITUTIONAL:  negative for fevers, chills, sweats, fatigue, weight loss  HEENT:  negative for vision, hearing changes, runny nose, throat pain  RESPIRATORY:  negative for shortness of breath, cough, congestion, wheezing. CARDIOVASCULAR:  negative for chest pain, palpitations. GASTROINTESTINAL:  negative for nausea, vomiting, diarrhea, constipation, change in bowel habits, abdominal pain   GENITOURINARY:  negative for difficulty of urination, burning with urination, frequency   INTEGUMENT:  negative for rash, skin lesions, easy bruising   HEMATOLOGIC/LYMPHATIC:  negative for swelling/edema   ALLERGIC/IMMUNOLOGIC:  negative for urticaria , itching  ENDOCRINE:  negative increase in drinking, increase in urination, hot or cold intolerance  MUSCULOSKELETAL:  negative joint pains, muscle aches, swelling of joints  NEUROLOGICAL:  negative for headaches, dizziness, lightheadedness, numbness, pain, tingling extremities  BEHAVIOR/PSYCH:  negative for depression, anxiety    Physical Exam:     BP (!) 169/85   Pulse 63   Temp 97.5 °F (36.4 °C)   Resp 15   Ht 5' 8\" (1.727 m)   Wt 149 lb (67.6 kg)   SpO2 98%   BMI 22.66 kg/m²   Temp (24hrs), Av.2 °F (36.2 °C), Min:96.6 °F (35.9 °C), Max:97.5 °F (36.4 °C)    No results for input(s): POCGLU in the last 72 hours.     Intake/Output Summary (Last 24 hours) at 2/15/2023 1407  Last data filed at 2/15/2023 1200  Gross per 24 hour   Intake 1825 ml   Output 385 ml   Net 1440 ml       General Appearance:  alert, well appearing, and in no acute distress  Mental status: oriented to person, place, and time with normal affect  Head:  normocephalic, atraumatic. Eye: no icterus, redness, pupils equal and reactive, extraocular eye movements intact, conjunctiva clear  Ear: normal external ear, no discharge, hearing intact  Nose:  no drainage noted  Mouth: mucous membranes moist  Neck: supple, no carotid bruits, thyroid not palpable  Lungs: Bilateral equal air entry, clear to ausculation, no wheezing, rales or rhonchi, normal effort  Cardiovascular: normal rate, regular rhythm, no murmur, gallop, rub. Abdomen: Soft, nontender, nondistended, normal bowel sounds, no hepatomegaly or splenomegaly  Neurologic: There are no new focal motor or sensory deficits, normal muscle tone and bulk, no abnormal sensation, normal speech, cranial nerves II through XII grossly intact  Skin: No gross lesions, rashes, bruising or bleeding on exposed skin area  Extremities:  peripheral pulses palpable, no pedal edema or calf pain with palpation  Psych: normal affect     Investigations:      Laboratory Testing:  Recent Results (from the past 24 hour(s))   Hemoglobin and Hematocrit    Collection Time: 02/15/23 12:00 PM   Result Value Ref Range    Hemoglobin 14.1 13.5 - 17.5 g/dL    Hematocrit 42.7 41 - 53 %   Basic Metabolic Panel    Collection Time: 02/15/23 12:00 PM   Result Value Ref Range    Glucose 194 (H) 70 - 99 mg/dL    BUN 11 8 - 23 mg/dL    Creatinine 0.97 0.70 - 1.20 mg/dL    Est, Glom Filt Rate >60 >60 mL/min/1.73m2    Calcium 8.7 8.6 - 10.4 mg/dL    Sodium 134 (L) 135 - 144 mmol/L    Potassium 4.1 3.7 - 5.3 mmol/L    Chloride 101 98 - 107 mmol/L    CO2 24 20 - 31 mmol/L    Anion Gap 9 9 - 17 mmol/L       Imaging/Diagonstics:  No results found.     Assessment :      Hospital Problems             Last Modified POA    * (Principal) BPH with urinary obstruction 2/15/2023 Yes    Bladder calculi 2/15/2023 Yes       Plan:     Benign prostate hyperplasia with obstruction  Status post prostatectomy and removal of bladder calculi  Tellez catheter in place  Check morning labs  Essential hypertension  Valsartan/hydrochlorothiazide resumed, check morning labs to ensure stability of renal function and normal sodium  Hyperlipidemia  Continue statin (Lipitor)  Diabetes melitis  Continue metformin    Consultations:   IP CONSULT TO HOSPITALIST      Cristhian Fuentes DO  2/15/2023  2:07 PM    Copy sent to Dr. Sharona Delong MD

## 2023-02-15 NOTE — LETTER
Onalee Cranker Haselhuhn, MD [facility], [address] [place] [address],[address]  [place], [address]  P: [phone] / F: [phone]    Brief Postoperative Note  Surgical Facility: [facility]   2/15/23    Gisselle Khan  [id number]  1950    Dear Apolinar Avendano MD,    I've enclosed a Brief Op note on a procedure performed on your patient, Gisselle Khan today. Pre-operative Diagnosis: BPH with obstruction and bladder calculi  Post-operative Diagnosis: Same    Procedure: Robotic assisted laparoscopic suprapubic prostatectomy and removal of bladder calculi (>100)    Anesthesia: General    Surgeon: Cody Junior; Resident: none    Findings: >100 small bladder calculi present and removed. Plan: Observe overnight with anticipated discharge tomorrow morning. Home with indwelling toro catheter x 1 week. Thank you for allowing me to participate in the care of this patient. I will keep you updated on this patient's follow up and I look forward to serving you and your patients again in the future.         Electronically signed by Diana Whitten MD, FACS

## 2023-02-15 NOTE — PLAN OF CARE
Problem: Chronic Conditions and Co-morbidities  Goal: Patient's chronic conditions and co-morbidity symptoms are monitored and maintained or improved  Outcome: Progressing  Flowsheets (Taken 2/15/2023 1245)  Care Plan - Patient's Chronic Conditions and Co-Morbidity Symptoms are Monitored and Maintained or Improved:   Monitor and assess patient's chronic conditions and comorbid symptoms for stability, deterioration, or improvement   Collaborate with multidisciplinary team to address chronic and comorbid conditions and prevent exacerbation or deterioration   Update acute care plan with appropriate goals if chronic or comorbid symptoms are exacerbated and prevent overall improvement and discharge     Problem: Pain  Goal: Verbalizes/displays adequate comfort level or baseline comfort level  Outcome: Progressing     Problem: Discharge Planning  Goal: Discharge to home or other facility with appropriate resources  Outcome: Progressing  Flowsheets  Taken 2/15/2023 1245  Discharge to home or other facility with appropriate resources:   Identify barriers to discharge with patient and caregiver   Arrange for needed discharge resources and transportation as appropriate   Identify discharge learning needs (meds, wound care, etc)  Taken 2/15/2023 1226  Discharge to home or other facility with appropriate resources:   Identify barriers to discharge with patient and caregiver   Arrange for needed discharge resources and transportation as appropriate   Identify discharge learning needs (meds, wound care, etc)     Problem: Safety - Adult  Goal: Free from fall injury  Outcome: Progressing  Flowsheets (Taken 2/15/2023 1142)  Free From Fall Injury: Instruct family/caregiver on patient safety     Problem: ABCDS Injury Assessment  Goal: Absence of physical injury  Outcome: Progressing

## 2023-02-16 VITALS
WEIGHT: 149 LBS | OXYGEN SATURATION: 97 % | HEIGHT: 68 IN | RESPIRATION RATE: 16 BRPM | HEART RATE: 81 BPM | SYSTOLIC BLOOD PRESSURE: 138 MMHG | DIASTOLIC BLOOD PRESSURE: 78 MMHG | BODY MASS INDEX: 22.58 KG/M2 | TEMPERATURE: 98.8 F

## 2023-02-16 LAB
ANION GAP SERPL CALCULATED.3IONS-SCNC: 9 MMOL/L (ref 9–17)
BUN SERPL-MCNC: 13 MG/DL (ref 8–23)
CALCIUM SERPL-MCNC: 9.2 MG/DL (ref 8.6–10.4)
CHLORIDE SERPL-SCNC: 104 MMOL/L (ref 98–107)
CO2 SERPL-SCNC: 26 MMOL/L (ref 20–31)
CREAT SERPL-MCNC: 1 MG/DL (ref 0.7–1.2)
GFR SERPL CREATININE-BSD FRML MDRD: >60 ML/MIN/1.73M2
GLUCOSE SERPL-MCNC: 141 MG/DL (ref 70–99)
HCT VFR BLD AUTO: 41.7 % (ref 41–53)
HGB BLD-MCNC: 13.8 G/DL (ref 13.5–17.5)
MCH RBC QN AUTO: 29.5 PG (ref 26–34)
MCHC RBC AUTO-ENTMCNC: 33.1 G/DL (ref 31–37)
MCV RBC AUTO: 89.3 FL (ref 80–100)
MICROORGANISM SPEC CULT: NO GROWTH
PDW BLD-RTO: 15.1 % (ref 12.5–15.4)
PLATELET # BLD AUTO: 146 K/UL (ref 140–450)
PMV BLD AUTO: 8.7 FL (ref 6–12)
POTASSIUM SERPL-SCNC: 4.7 MMOL/L (ref 3.7–5.3)
RBC # BLD: 4.67 M/UL (ref 4.5–5.9)
SODIUM SERPL-SCNC: 139 MMOL/L (ref 135–144)
SPECIMEN DESCRIPTION: NORMAL
SURGICAL PATHOLOGY REPORT: NORMAL
WBC # BLD AUTO: 8 K/UL (ref 3.5–11)

## 2023-02-16 PROCEDURE — 36415 COLL VENOUS BLD VENIPUNCTURE: CPT

## 2023-02-16 PROCEDURE — 6370000000 HC RX 637 (ALT 250 FOR IP): Performed by: SPECIALIST

## 2023-02-16 PROCEDURE — 99232 SBSQ HOSP IP/OBS MODERATE 35: CPT | Performed by: HOSPITALIST

## 2023-02-16 PROCEDURE — 2580000003 HC RX 258: Performed by: SPECIALIST

## 2023-02-16 PROCEDURE — 85027 COMPLETE CBC AUTOMATED: CPT

## 2023-02-16 PROCEDURE — 6360000002 HC RX W HCPCS: Performed by: SPECIALIST

## 2023-02-16 PROCEDURE — 80048 BASIC METABOLIC PNL TOTAL CA: CPT

## 2023-02-16 RX ORDER — OXYCODONE HYDROCHLORIDE AND ACETAMINOPHEN 5; 325 MG/1; MG/1
1 TABLET ORAL EVERY 6 HOURS PRN
Qty: 20 TABLET | Refills: 0 | Status: SHIPPED | OUTPATIENT
Start: 2023-02-16 | End: 2023-02-21

## 2023-02-16 RX ORDER — DOCUSATE SODIUM 100 MG/1
100 CAPSULE, LIQUID FILLED ORAL 2 TIMES DAILY
Qty: 14 CAPSULE | Refills: 0 | Status: SHIPPED | OUTPATIENT
Start: 2023-02-16 | End: 2023-02-23

## 2023-02-16 RX ORDER — TROSPIUM CHLORIDE 20 MG/1
20 TABLET, FILM COATED ORAL
Qty: 60 TABLET | Refills: 3 | Status: SHIPPED | OUTPATIENT
Start: 2023-02-16

## 2023-02-16 RX ADMIN — ACETAMINOPHEN 650 MG: 325 TABLET ORAL at 12:28

## 2023-02-16 RX ADMIN — FINASTERIDE 5 MG: 5 TABLET, FILM COATED ORAL at 09:02

## 2023-02-16 RX ADMIN — ATORVASTATIN CALCIUM 20 MG: 20 TABLET, FILM COATED ORAL at 09:02

## 2023-02-16 RX ADMIN — CEFAZOLIN 2000 MG: 2 INJECTION, POWDER, FOR SOLUTION INTRAMUSCULAR; INTRAVENOUS at 09:13

## 2023-02-16 RX ADMIN — Medication 500 MG: at 09:02

## 2023-02-16 RX ADMIN — TROSPIUM CHLORIDE 20 MG: 20 TABLET, FILM COATED ORAL at 06:21

## 2023-02-16 RX ADMIN — VALSARTAN AND HYDROCHLOROTHIAZIDE 1 TABLET: 160; 12.5 TABLET, FILM COATED ORAL at 09:02

## 2023-02-16 RX ADMIN — SODIUM CHLORIDE, POTASSIUM CHLORIDE, SODIUM LACTATE AND CALCIUM CHLORIDE: 600; 310; 30; 20 INJECTION, SOLUTION INTRAVENOUS at 06:21

## 2023-02-16 RX ADMIN — ACETAMINOPHEN 650 MG: 325 TABLET ORAL at 06:21

## 2023-02-16 RX ADMIN — SODIUM CHLORIDE, PRESERVATIVE FREE 10 ML: 5 INJECTION INTRAVENOUS at 09:07

## 2023-02-16 ASSESSMENT — PAIN - FUNCTIONAL ASSESSMENT: PAIN_FUNCTIONAL_ASSESSMENT: ACTIVITIES ARE NOT PREVENTED

## 2023-02-16 ASSESSMENT — PAIN DESCRIPTION - LOCATION: LOCATION: PENIS

## 2023-02-16 ASSESSMENT — PAIN DESCRIPTION - DESCRIPTORS: DESCRIPTORS: BURNING;DISCOMFORT

## 2023-02-16 ASSESSMENT — PAIN SCALES - WONG BAKER
WONGBAKER_NUMERICALRESPONSE: 0
WONGBAKER_NUMERICALRESPONSE: 0

## 2023-02-16 ASSESSMENT — PAIN SCALES - GENERAL: PAINLEVEL_OUTOF10: 4

## 2023-02-16 NOTE — CARE COORDINATION
Case Management Assessment  Initial Evaluation    Date/Time of Evaluation: 2/16/2023 8:34 AM  Assessment Completed by: Jason Mejia RN    If patient is discharged prior to next notation, then this note serves as note for discharge by case management. Patient Name: Kalyn Real                   YOB: 1950  Diagnosis: Bladder calculi [N21.0]  BPH with urinary obstruction [N40.1, N13.8]                   Date / Time: 2/15/2023  5:32 AM    Patient Admission Status: Outpatient in a bed   Readmission Risk (Low < 19, Mod (19-27), High > 27): No data recorded  Current PCP: Adarsh Chun MD  PCP verified by CM? Yes    Chart Reviewed: Yes      History Provided by: Patient  Patient Orientation: Alert and Oriented    Patient Cognition: Alert    Hospitalization in the last 30 days (Readmission):  No    If yes, Readmission Assessment in  Navigator will be completed. Advance Directives:      Code Status: Full Code   Patient's Primary Decision Maker is: Legal Next of Kin      Discharge Planning:    Patient lives with: Alone Type of Home: House  Primary Care Giver: Self  Patient Support Systems include: Children, Family Members   Current Financial resources: Medicare  Current community resources: None  Current services prior to admission: None            Current DME:              Type of Home Care services:  None    ADLS  Prior functional level: Independent in ADLs/IADLs  Current functional level: Independent in ADLs/IADLs    PT AM-PAC:   /24  OT AM-PAC:   /24    Family can provide assistance at DC: Yes  Would you like Case Management to discuss the discharge plan with any other family members/significant others, and if so, who?     Plans to Return to Present Housing: Yes  Other Identified Issues/Barriers to RETURNING to current housing:   Potential Assistance needed at discharge: N/A            Potential DME:    Patient expects to discharge to: [address] [place] [address] for transportation at discharge: Family    Financial    Payor: [facility] / Plan: Delvin Cormier / Product Type: *No Product type* /     Does insurance require precert for SNF: Yes    Potential assistance Purchasing Medications:    Meds-to-Beds request:        [address] Mail Perry Garcias [phone] - F [phone]  Parazeb [address] [place] [address]  Phone: [phone] Fax: [phone]    Sadie Guevara [address] [place] [address] [address]  [address] [facility] [address]  Phone: [phone] Fax: [phone]      Notes:    Factors facilitating achievement of predicted outcomes:     Barriers to discharge: Additional Case Management Notes: d/c home independent    The Plan for Transition of Care is related to the following treatment goals of Bladder calculi [N21.0]  BPH with urinary obstruction [N40.1, X08.3]    IF APPLICABLE: The Patient and/or patient representative Parag Sanchez and his family were provided with a choice of provider and agrees with the discharge plan. Freedom of choice list with basic dialogue that supports the patient's individualized plan of care/goals and shares the quality data associated with the providers was provided to: Patient   Patient Representative Name:       The Patient and/or Patient Representative Agree with the Discharge Plan?  Yes    Jason Mejia RN  Case Management Department  Ph: [phone] Fax: [phone]

## 2023-02-16 NOTE — ADDENDUM NOTE
Addendum  created 02/16/23 1212 by Michael Tran MD    Attestation recorded in [facility], [facility] filed

## 2023-02-16 NOTE — PLAN OF CARE
Problem: Pain  Goal: Verbalizes/displays adequate comfort level or baseline comfort level  2/16/2023 0319 by Palmer Logan RN  Outcome: Progressing   No new signs/symptoms of pain noted, pain rating < 3 on scale 0-10, pain controlled with medication/repositioning. Problem: Discharge Planning  Goal: Discharge to home or other facility with appropriate resources  2/16/2023 0319 by Palmer Logan RN  Outcome: Progressing   Patient actively participates in ADLs and decision making regarding plan of care. Problem: Safety - Adult  Goal: Free from fall injury  2/16/2023 0319 by Palmer Logan RN  Outcome: Progressing   No falls/injuries this shift, bed in lowest position, brakes on, bed alarm on, call light in reach, side rails up x2.

## 2023-02-16 NOTE — PROGRESS NOTES
[facility]  Office: [address], DO, Juanis Skanderss, DO, Gerson Kylah, DO, Negar Shirk Blood, DO, Vaughn Turpin MD, Jessi King MD, Katy Mart MD, Ashley Chanel MD,  Brian Mccall MD, Aiden Hansen MD, Ezra Echevarria, DO, Mildred Mckeon MD,  Jessica Casas MD, Won Couch MD, Coye Meigs, DO, Mejia Ladd MD, Braydon Smith MD, Mateus Fry, DO, Clearance MD Regina, Vazquez Parsons MD, Joie Zambrano MD, Isrrael Walter MD, Tommy Marc DO, Jaime Chung MD, Matteo Awad MD, Evelia Sky, CNP,  María Lr, CNP, Nadya Perez, CNP, Keo Kirby, CNP,  Zarina Deluca, [facility], Jyoti Roche, CNP, Emmie Howard, CNP, Lauren Loyd, CNP, Richard Ugarte, CNP, Serene Chin, CNP, Chip Watson PA-C, Gayle Leong, CNS, Renetta Seo, CNP, Neeta Holcomb [address]    Progress Note    2/16/2023    10:55 AM    Name:   Benton Vera  MRN:     [id number]     Acct:      [de-identified]   Room:   [address] [place] [address] Day:  0  Admit Date:  2/15/2023  5:32 AM    PCP:   Lisa Simmons MD  Code Status:  Full Code    Subjective:     C/C: \" I feel fine, I am just a little sore\"    Interval History Status: improved. The patient is doing quite well. He is postop day #1 and states that his pain is under control. Case discussed with urology. Patient is hemodynamically stable at this point in time can be discharged home. He denies any questions or concerns. He understands he is being discharged home with Tellez catheter and is instructed to follow-up with urology as an outpatient. Brief History: This very pleasant 78-year-old male has been found to have benign prostate hyperplasia with obstruction. He underwent suprapubic prostatectomy along with removal of bladder stones. He is postop day #1 and doing well and eager for discharge home.     Review of Systems:     Constitutional: negative for chills, fevers, sweats  Respiratory:  negative for cough, dyspnea on exertion, shortness of breath, wheezing  Cardiovascular:  negative for chest pain, chest pressure/discomfort, lower extremity edema, palpitations  Gastrointestinal: Minimal postoperative pain. Patient denies any constipation, diarrhea, nausea, vomiting  Neurological:  negative for dizziness, headache    Medications: Allergies:  No Known Allergies    Current Meds:   Scheduled Meds:    atorvastatin  20 mg Oral Daily    finasteride  5 mg Oral Daily    metFORMIN  500 mg Oral BID WC    sodium chloride flush  5-40 mL IntraVENous 2 times per day    acetaminophen  650 mg Oral 4 times per day    trospium  20 mg Oral BID AC    valsartan-hydroCHLOROthiazide  1 tablet Oral Daily     Continuous Infusions:    sodium chloride 25 mL/hr at 02/15/23 1747    lactated ringers IV soln 100 mL/hr at 23 0812     PRN Meds: sodium chloride flush, sodium chloride, oxyCODONE **OR** oxyCODONE, morphine **OR** morphine, ondansetron, phenazopyridine    Data:     Past Medical History:   has a past medical history of Adenomatous colon polyp, Caffeine use, History of colon polyps, Hyperlipidemia, Hypertension, and Pre-diabetes. Social History:   reports that he has never smoked. He has been exposed to tobacco smoke. He has never used smokeless tobacco. He reports current alcohol use of about 2.0 standard drinks per week. He reports that he does not use drugs. Family History:   Family History   Problem Relation Age of Onset    Heart Attack Mother     Prostate Cancer Father        Vitals:  /78   Pulse 81   Temp 98.8 °F (37.1 °C) (Oral)   Resp 16   Ht 5' 8\" (1.727 m)   Wt 149 lb (67.6 kg)   SpO2 97%   BMI 22.66 kg/m²   Temp (24hrs), Av.3 °F (36.8 °C), Min:97.5 °F (36.4 °C), Max:98.8 °F (37.1 °C)    No results for input(s): POCGLU in the last 72 hours. I/O (24Hr):     Intake/Output Summary (Last 24 hours) at 2023 1055  Last data filed at 2/16/2023 1020  Gross per 24 hour   Intake 1772.11 ml   Output 5175 ml   Net -3402.89 ml       Labs:  Hematology:  Recent Labs     02/15/23  1200 02/16/23  0522   WBC  --  8.0   RBC  --  4.67   HGB 14.1 13.8   HCT 42.7 41.7   MCV  --  89.3   MCH  --  29.5   MCHC  --  33.1   RDW  --  15.1   PLT  --  146   MPV  --  8.7     Chemistry:  Recent Labs     02/15/23  1200 02/16/23  0522   * 139   K 4.1 4.7    104   CO2 24 26   GLUCOSE 194* 141*   BUN 11 13   CREATININE 0.97 1.00   ANIONGAP 9 9   LABGLOM >60 >60   CALCIUM 8.7 9.2   No results for input(s): PROT, LABALBU, LABA1C, Q3ESQBX, E7YYQJL, FT4, TSH, AST, ALT, LDH, GGT, ALKPHOS, LABGGT, BILITOT, BILIDIR, AMMONIA, AMYLASE, LIPASE, LACTATE, CHOL, HDL, LDLCHOLESTEROL, CHOLHDLRATIO, TRIG, VLDL, BEF25NB, PHENYTOIN, PHENYF, URICACID, POCGLU in the last 72 hours. ABG:No results found for: POCPH, PHART, PH, POCPCO2, GJV5RSC, PCO2, POCPO2, PO2ART, PO2, POCHCO3, FQG6AHP, HCO3, NBEA, PBEA, BEART, BE, THGBART, THB, CZC5PJY, LDRF8SAI, A7ELHRPS, O2SAT, FIO2  No results found for: SPECIAL  No results found for: CULTURE    Radiology:  No results found.     Physical Examination:       General appearance:  alert, cooperative and no distress  Mental Status:  oriented to person, place and time and normal affect  Lungs:  clear to auscultation bilaterally, normal effort  Heart:  regular rate and rhythm, no murmur  Abdomen:  soft, nontender, nondistended, normal bowel sounds, no masses, hepatomegaly, splenomegaly  Extremities:  no edema, redness, tenderness in the calves  Skin:  no gross lesions, rashes, induration    Assessment:     Hospital Problems             Last Modified POA    * (Principal) BPH with urinary obstruction 2/15/2023 Yes    Bladder calculi 2/15/2023 Yes       Plan:     Benign prostate hyperplasia with obstruction  Status post prostatectomy and removal of bladder calculi  Discharge home with outpatient follow-up for Tellez removal  Essential hypertension  Continue valsartan/hydrochlorothiazide  Laboratory data stable  Hyperlipidemia  Continue statin  Diabetes melitis  Continue metformin    Discharge home    Suzanne Vasquez DO  2/16/2023  10:55 AM

## 2023-02-16 NOTE — FLOWSHEET NOTE
Pt toro switched over to leg bag. Instructed pt on how to change leg bag and went over toro care for home. Pt did not have any further questions. Went over discharge paperwork and added toro care information in AVS packet. Educated patient on using HCG Bath wash for incisions.

## 2023-02-16 NOTE — PLAN OF CARE
Problem: Chronic Conditions and Co-morbidities  Goal: Patient's chronic conditions and co-morbidity symptoms are monitored and maintained or improved  2/16/2023 1232 by Kyle Gross RN  Outcome: Completed  2/16/2023 1232 by Kyle Gross RN  Outcome: Progressing  2/16/2023 0319 by Saintclair Penna, RN  Outcome: Progressing     Problem: Pain  Goal: Verbalizes/displays adequate comfort level or baseline comfort level  2/16/2023 1232 by Kyle Gross RN  Outcome: Completed  2/16/2023 1232 by Kyle Gross RN  Outcome: Progressing  2/16/2023 0319 by Saintclair Penna, RN  Outcome: Progressing     Problem: Discharge Planning  Goal: Discharge to home or other facility with appropriate resources  2/16/2023 1232 by Kyle Gross RN  Outcome: Completed  2/16/2023 1232 by Kyle Gross RN  Outcome: Progressing  2/16/2023 0319 by Saintclair Penna, RN  Outcome: Progressing     Problem: Safety - Adult  Goal: Free from fall injury  2/16/2023 1232 by Kyle Gross RN  Outcome: Completed  2/16/2023 1232 by Kyle Gross RN  Outcome: Progressing  2/16/2023 0319 by Saintclair Penna, RN  Outcome: Progressing     Problem: ABCDS Injury Assessment  Goal: Absence of physical injury  2/16/2023 1232 by Kyle Gross RN  Outcome: Completed  2/16/2023 1232 by Kyle Gross RN  Outcome: Progressing  2/16/2023 0319 by Saintclair Penna, RN  Outcome: Progressing

## 2023-02-16 NOTE — DISCHARGE SUMMARY
[facility]  Office: [address], DO, Latoya Thakkar, DO, Jennifer Tomlin, DO, Jer Charles, DO, Blanca Johnson MD, Rachel Dior MD, Kennis Osler, MD, Yuridia Blakely MD,  Dewayne Moreno MD, Dia Pablo MD, Lázaro Francois, DO, Jen Chi MD,  Ryan Dixon, DO, Joshua Ortiz MD, Haylee Castro MD, Elvira Pastor, DO, Rick Daigle MD, Mali Denny MD, Mya Block, DO, Joanie Hinkle MD, Digna Prado MD, Casper Hylton MD, Yudi Kaur MD, Connor Blackwell, DO, Anay Lewis MD, Tez Hinkle MD, Whitney Jade, CNP,  Lele Strauss, CNP, Juan Carlos Amaya, CNP, Tom Mckenzie, CNP,  Lg Franco, [facility], Natalio Ortiz, CNP, Jaye Prasad, CNP, Sylvia Flaherty, CNP, Deneen Massey, CNP, Rajat Fitzpatrick, CNP, Giselle Hutchison PA-C, Nacho Shi, CNS, Feliciano Quesada, CNP, Elaina Otero, CNP         [facility]    Discharge Summary     Patient ID: Chela Garcia  :  1950   MRN: [id number]     ACCOUNT:  [de-identified]   Patient's PCP: Mal Aschoff, MD  Admit Date: 2/15/2023   Discharge Date: 2023    Length of Stay: 0  Code Status:  Full Code  Admitting Physician: Kiko Cedillo DO  Discharge Physician: Kiko Cedillo DO     Active Discharge Diagnoses:     Hospital Problem Lists:  Principal Problem:    BPH with urinary obstruction  Active Problems:    Bladder calculi  Resolved Problems:    * No resolved hospital problems. *      Admission Condition:  fair     Discharged Condition: good    Hospital Stay:     Hospital Course:  Chela Garcia is a 74 y.o. male who was admitted for the management of  BPH with urinary obstruction. The patient underwent suprapubic prostatectomy with bladder stone removal.  The patient was admitted and observed overnight. Renal function remained stable and his pain was under excellent control. The patient ultimately was discharged in stable condition.     Significant therapeutic interventions: As above    Significant Diagnostic Studies:   Labs / Micro:  CBC:   Lab Results   Component Value Date/Time    WBC 8.0 02/16/2023 05:22 AM    RBC 4.67 02/16/2023 05:22 AM    HGB 13.8 02/16/2023 05:22 AM    HCT 41.7 02/16/2023 05:22 AM    MCV 89.3 02/16/2023 05:22 AM    MCH 29.5 02/16/2023 05:22 AM    MCHC 33.1 02/16/2023 05:22 AM    RDW 15.1 02/16/2023 05:22 AM     02/16/2023 05:22 AM     BMP:    Lab Results   Component Value Date/Time    GLUCOSE 141 02/16/2023 05:22 AM     02/16/2023 05:22 AM    K 4.7 02/16/2023 05:22 AM     02/16/2023 05:22 AM    CO2 26 02/16/2023 05:22 AM    ANIONGAP 9 02/16/2023 05:22 AM    BUN 13 02/16/2023 05:22 AM    CREATININE 1.00 02/16/2023 05:22 AM    BUNCRER NOT REPORTED 12/15/2021 09:22 AM    CALCIUM 9.2 02/16/2023 05:22 AM    LABGLOM >60 02/16/2023 05:22 AM    GFRAA >60 05/13/2022 08:32 AM    GFR      05/13/2022 08:32 AM        Radiology:  No results found. Consultations:    Consults:     Final Specialist Recommendations/Findings:   IP CONSULT TO HOSPITALIST      The patient was seen and examined on day of discharge and this discharge summary is in conjunction with any daily progress note from day of discharge.     Discharge plan:     Disposition: Home    Physician Follow Up:     Adarsh Chun MD  [phone]    Follow up      Shanique Atkins MD  [facility], [address]  [phone]    Follow up         Requiring Further Evaluation/Follow Up POST HOSPITALIZATION/Incidental Findings: None    Diet: diabetic diet    Activity: As tolerated    Instructions to Patient: None    Discharge Medications:      Medication List        START taking these medications      docusate sodium 100 MG capsule  Commonly known as: COLACE  Take 1 capsule by mouth 2 times daily for 7 days     oxyCODONE-acetaminophen 5-325 MG per tablet  Commonly known as: Percocet  Take 1 tablet by mouth every 6 hours as needed for Pain for up to 5 days. Intended supply: 5 days. Take lowest dose possible to manage pain Max Daily Amount: 4 tablets     trospium 20 MG tablet  Commonly known as: SANCTURA  Take 1 tablet by mouth 2 times daily (before meals)            CONTINUE taking these medications      atorvastatin 20 MG tablet  Commonly known as: LIPITOR  TAKE 1 TABLET EVERY DAY     finasteride 5 MG tablet  Commonly known as: PROSCAR  Take 1 tablet by mouth daily     metFORMIN 500 MG tablet  Commonly known as: GLUCOPHAGE  Take 1 tablet by mouth 2 times daily (with meals)     valsartan-hydroCHLOROthiazide 160-12.5 MG per tablet  Commonly known as: DIOVAN-HCT  TAKE 1 TABLET EVERY DAY               Where to Get Your Medications        These medications were sent to [facility] [address], [facility] [address] [place]  [address] [place] [address]      Phone: [phone]   docusate sodium 100 MG capsule  oxyCODONE-acetaminophen 5-325 MG per tablet  trospium 20 MG tablet         No discharge procedures on file. Time Spent on discharge is  20 mins in patient examination, evaluation, counseling as well as medication reconciliation, prescriptions for required medications, discharge plan and follow up. Electronically signed by   Maria Dolores Hess DO  2/16/2023  7:02 AM      Thank you Dr. Zak Boo MD for the opportunity to be involved in this patient's care.

## 2023-02-22 ENCOUNTER — HOSPITAL ENCOUNTER (OUTPATIENT)
Dept: GENERAL RADIOLOGY | Age: 73
Discharge: HOME | End: 2023-02-24
Payer: MEDICARE

## 2023-02-22 DIAGNOSIS — N40.1 ENLARGED PROSTATE WITH URINARY OBSTRUCTION: ICD-10-CM

## 2023-02-22 DIAGNOSIS — N13.8 ENLARGED PROSTATE WITH URINARY OBSTRUCTION: ICD-10-CM

## 2023-02-22 PROCEDURE — 51600 INJECTION FOR BLADDER X-RAY: CPT

## 2023-02-22 PROCEDURE — 6360000004 HC RX CONTRAST MEDICATION: Performed by: SPECIALIST

## 2023-02-22 RX ADMIN — DIATRIZOATE MEGLUMINE 300 ML: 300 INJECTION, SOLUTION INTRAVENOUS at 09:53

## 2023-02-28 ENCOUNTER — OFFICE VISIT (OUTPATIENT)
Dept: INTERNAL MEDICINE CLINIC | Age: 73
End: 2023-02-28

## 2023-02-28 ENCOUNTER — APPOINTMENT (OUTPATIENT)
Dept: CT IMAGING | Age: 73
End: 2023-02-28
Payer: MEDICARE

## 2023-02-28 ENCOUNTER — HOSPITAL ENCOUNTER (INPATIENT)
Age: 73
LOS: 2 days | Discharge: HOME | End: 2023-03-03
Attending: EMERGENCY MEDICINE | Admitting: FAMILY MEDICINE
Payer: MEDICARE

## 2023-02-28 ENCOUNTER — APPOINTMENT (OUTPATIENT)
Dept: GENERAL RADIOLOGY | Age: 73
End: 2023-02-28
Payer: MEDICARE

## 2023-02-28 VITALS
BODY MASS INDEX: 22.28 KG/M2 | OXYGEN SATURATION: 100 % | TEMPERATURE: 97.7 F | DIASTOLIC BLOOD PRESSURE: 58 MMHG | RESPIRATION RATE: 16 BRPM | HEIGHT: 68 IN | WEIGHT: 147 LBS | SYSTOLIC BLOOD PRESSURE: 110 MMHG | HEART RATE: 100 BPM

## 2023-02-28 DIAGNOSIS — E78.5 DYSLIPIDEMIA: ICD-10-CM

## 2023-02-28 DIAGNOSIS — N40.1 BPH WITH URINARY OBSTRUCTION: ICD-10-CM

## 2023-02-28 DIAGNOSIS — I10 ESSENTIAL HYPERTENSION: ICD-10-CM

## 2023-02-28 DIAGNOSIS — N30.00 ACUTE CYSTITIS WITHOUT HEMATURIA: Primary | ICD-10-CM

## 2023-02-28 DIAGNOSIS — N13.8 BPH WITH URINARY OBSTRUCTION: ICD-10-CM

## 2023-02-28 DIAGNOSIS — Z09 HOSPITAL DISCHARGE FOLLOW-UP: Primary | ICD-10-CM

## 2023-02-28 DIAGNOSIS — N52.01 ERECTILE DYSFUNCTION DUE TO ARTERIAL INSUFFICIENCY: ICD-10-CM

## 2023-02-28 DIAGNOSIS — Z80.42 FAMILY HISTORY OF PROSTATE CANCER: ICD-10-CM

## 2023-02-28 DIAGNOSIS — Z28.21 PNEUMOCOCCAL VACCINATION DECLINED: ICD-10-CM

## 2023-02-28 DIAGNOSIS — N28.9 RENAL INSUFFICIENCY: ICD-10-CM

## 2023-02-28 DIAGNOSIS — N13.8 BPH WITH OBSTRUCTION/LOWER URINARY TRACT SYMPTOMS: ICD-10-CM

## 2023-02-28 DIAGNOSIS — N18.2 TYPE 2 DIABETES MELLITUS WITH STAGE 2 CHRONIC KIDNEY DISEASE, WITHOUT LONG-TERM CURRENT USE OF INSULIN (HCC): ICD-10-CM

## 2023-02-28 DIAGNOSIS — Z90.49 S/P CHOLECYSTECTOMY: ICD-10-CM

## 2023-02-28 DIAGNOSIS — D12.6 ADENOMATOUS POLYP OF COLON, UNSPECIFIED PART OF COLON: ICD-10-CM

## 2023-02-28 DIAGNOSIS — N40.1 BPH WITH OBSTRUCTION/LOWER URINARY TRACT SYMPTOMS: ICD-10-CM

## 2023-02-28 DIAGNOSIS — E11.22 TYPE 2 DIABETES MELLITUS WITH STAGE 2 CHRONIC KIDNEY DISEASE, WITHOUT LONG-TERM CURRENT USE OF INSULIN (HCC): ICD-10-CM

## 2023-02-28 DIAGNOSIS — Z28.21 TETANUS, DIPHTHERIA, AND ACELLULAR PERTUSSIS (TDAP) VACCINATION DECLINED: ICD-10-CM

## 2023-02-28 DIAGNOSIS — Z78.9 DAILY CONSUMPTION OF ALCOHOL: ICD-10-CM

## 2023-02-28 PROBLEM — N21.0 BLADDER CALCULI: Status: RESOLVED | Noted: 2023-02-15 | Resolved: 2023-02-28

## 2023-02-28 PROBLEM — R31.0 GROSS HEMATURIA: Status: RESOLVED | Noted: 2022-12-19 | Resolved: 2023-02-28

## 2023-02-28 LAB
ABSOLUTE EOS #: 0 K/UL (ref 0–0.4)
ABSOLUTE IMMATURE GRANULOCYTE: 0 K/UL (ref 0–0.3)
ABSOLUTE LYMPH #: 0.71 K/UL (ref 1–4.8)
ABSOLUTE MONO #: 0.41 K/UL (ref 0.2–0.8)
ANION GAP SERPL CALCULATED.3IONS-SCNC: 13 MMOL/L (ref 9–17)
BACTERIA: ABNORMAL
BASOPHILS # BLD: 0 %
BASOPHILS ABSOLUTE: 0 K/UL (ref 0–0.2)
BILIRUBIN URINE: NEGATIVE
BUN SERPL-MCNC: 52 MG/DL (ref 8–23)
BUN/CREAT BLD: 38 (ref 9–20)
CALCIUM SERPL-MCNC: 9.8 MG/DL (ref 8.6–10.4)
CHLORIDE SERPL-SCNC: 93 MMOL/L (ref 98–107)
CO2 SERPL-SCNC: 25 MMOL/L (ref 20–31)
COLOR: YELLOW
CREAT SERPL-MCNC: 1.37 MG/DL (ref 0.7–1.2)
EOSINOPHILS RELATIVE PERCENT: 0 % (ref 1–4)
EPITHELIAL CELLS UA: ABNORMAL /HPF (ref 0–5)
GFR SERPL CREATININE-BSD FRML MDRD: 54 ML/MIN/1.73M2
GLUCOSE SERPL-MCNC: 135 MG/DL (ref 70–99)
GLUCOSE UR STRIP.AUTO-MCNC: NEGATIVE MG/DL
HCT VFR BLD AUTO: 36.9 % (ref 40.7–50.3)
HGB BLD-MCNC: 12.2 G/DL (ref 13–17)
IMMATURE GRANULOCYTES: 0 %
KETONES UR STRIP.AUTO-MCNC: NEGATIVE MG/DL
LACTATE PLASV-SCNC: 1.3 MMOL/L (ref 0.5–2.2)
LEUKOCYTE ESTERASE UR QL STRIP.AUTO: ABNORMAL
LYMPHOCYTES # BLD: 12 % (ref 24–44)
MCH RBC QN AUTO: 28.9 PG (ref 25.2–33.5)
MCHC RBC AUTO-ENTMCNC: 33.1 G/DL (ref 28.4–34.8)
MCV RBC AUTO: 87.4 FL (ref 82.6–102.9)
MONOCYTES # BLD: 7 % (ref 1–7)
NITRITE UR QL STRIP.AUTO: POSITIVE
NRBC AUTOMATED: 0 PER 100 WBC
PDW BLD-RTO: 14.1 % (ref 11.8–14.4)
PLATELET # BLD AUTO: 241 K/UL (ref 138–453)
PMV BLD AUTO: 9.6 FL (ref 8.1–13.5)
POTASSIUM SERPL-SCNC: 4 MMOL/L (ref 3.7–5.3)
PROT UR STRIP.AUTO-MCNC: 6 MG/DL (ref 5–8)
PROT UR STRIP.AUTO-MCNC: ABNORMAL MG/DL
RBC # BLD: 4.22 M/UL (ref 4.21–5.77)
RBC CLUMPS #/AREA URNS AUTO: ABNORMAL /HPF (ref 0–2)
SARS-COV-2 RDRP RESP QL NAA+PROBE: NOT DETECTED
SEG NEUTROPHILS: 81 % (ref 36–66)
SEGMENTED NEUTROPHILS ABSOLUTE COUNT: 4.78 K/UL (ref 1.8–7.7)
SODIUM SERPL-SCNC: 131 MMOL/L (ref 135–144)
SPECIFIC GRAVITY UA: 1.01 (ref 1–1.03)
SPECIMEN DESCRIPTION: NORMAL
TURBIDITY: ABNORMAL
URINE HGB: ABNORMAL
UROBILINOGEN, URINE: NORMAL
WBC # BLD AUTO: 5.9 K/UL (ref 3.5–11.3)
WBC UA: ABNORMAL /HPF (ref 0–5)

## 2023-02-28 PROCEDURE — 6370000000 HC RX 637 (ALT 250 FOR IP): Performed by: EMERGENCY MEDICINE

## 2023-02-28 PROCEDURE — 73110 X-RAY EXAM OF WRIST: CPT

## 2023-02-28 PROCEDURE — 2580000003 HC RX 258: Performed by: EMERGENCY MEDICINE

## 2023-02-28 PROCEDURE — 71045 X-RAY EXAM CHEST 1 VIEW: CPT

## 2023-02-28 PROCEDURE — 87077 CULTURE AEROBIC IDENTIFY: CPT

## 2023-02-28 PROCEDURE — 87086 URINE CULTURE/COLONY COUNT: CPT

## 2023-02-28 PROCEDURE — 81001 URINALYSIS AUTO W/SCOPE: CPT

## 2023-02-28 PROCEDURE — 73130 X-RAY EXAM OF HAND: CPT

## 2023-02-28 PROCEDURE — 87040 BLOOD CULTURE FOR BACTERIA: CPT

## 2023-02-28 PROCEDURE — 80048 BASIC METABOLIC PNL TOTAL CA: CPT

## 2023-02-28 PROCEDURE — 87186 SC STD MICRODIL/AGAR DIL: CPT

## 2023-02-28 PROCEDURE — 83605 ASSAY OF LACTIC ACID: CPT

## 2023-02-28 PROCEDURE — 6360000002 HC RX W HCPCS: Performed by: EMERGENCY MEDICINE

## 2023-02-28 PROCEDURE — 73502 X-RAY EXAM HIP UNI 2-3 VIEWS: CPT

## 2023-02-28 PROCEDURE — 99285 EMERGENCY DEPT VISIT HI MDM: CPT

## 2023-02-28 PROCEDURE — 87635 SARS-COV-2 COVID-19 AMP PRB: CPT

## 2023-02-28 PROCEDURE — 85025 COMPLETE CBC W/AUTO DIFF WBC: CPT

## 2023-02-28 PROCEDURE — 96365 THER/PROPH/DIAG IV INF INIT: CPT

## 2023-02-28 PROCEDURE — 70450 CT HEAD/BRAIN W/O DYE: CPT

## 2023-02-28 RX ORDER — SODIUM CHLORIDE 9 MG/ML
INJECTION, SOLUTION INTRAVENOUS CONTINUOUS
Status: DISCONTINUED | OUTPATIENT
Start: 2023-02-28 | End: 2023-03-03 | Stop reason: HOSPADM

## 2023-02-28 RX ORDER — ACETAMINOPHEN 325 MG/1
650 TABLET ORAL ONCE
Status: COMPLETED | OUTPATIENT
Start: 2023-02-28 | End: 2023-02-28

## 2023-02-28 RX ADMIN — CEFTRIAXONE SODIUM 1000 MG: 1 INJECTION, POWDER, FOR SOLUTION INTRAMUSCULAR; INTRAVENOUS at 23:41

## 2023-02-28 RX ADMIN — ACETAMINOPHEN 650 MG: 325 TABLET ORAL at 23:46

## 2023-02-28 RX ADMIN — SODIUM CHLORIDE: 9 INJECTION, SOLUTION INTRAVENOUS at 23:41

## 2023-02-28 SDOH — ECONOMIC STABILITY: FOOD INSECURITY: WITHIN THE PAST 12 MONTHS, YOU WORRIED THAT YOUR FOOD WOULD RUN OUT BEFORE YOU GOT MONEY TO BUY MORE.: NEVER TRUE

## 2023-02-28 SDOH — ECONOMIC STABILITY: HOUSING INSECURITY
IN THE LAST 12 MONTHS, WAS THERE A TIME WHEN YOU DID NOT HAVE A STEADY PLACE TO SLEEP OR SLEPT IN A SHELTER (INCLUDING NOW)?: NO

## 2023-02-28 SDOH — ECONOMIC STABILITY: FOOD INSECURITY: WITHIN THE PAST 12 MONTHS, THE FOOD YOU BOUGHT JUST DIDN'T LAST AND YOU DIDN'T HAVE MONEY TO GET MORE.: NEVER TRUE

## 2023-02-28 SDOH — ECONOMIC STABILITY: INCOME INSECURITY: HOW HARD IS IT FOR YOU TO PAY FOR THE VERY BASICS LIKE FOOD, HOUSING, MEDICAL CARE, AND HEATING?: NOT HARD AT ALL

## 2023-02-28 ASSESSMENT — ENCOUNTER SYMPTOMS
DIARRHEA: 0
SHORTNESS OF BREATH: 0
VOMITING: 0
CONSTIPATION: 0
COUGH: 0
FACIAL SWELLING: 0
COLOR CHANGE: 0
EYE REDNESS: 0
EYE DISCHARGE: 0
ABDOMINAL PAIN: 0

## 2023-02-28 ASSESSMENT — PATIENT HEALTH QUESTIONNAIRE - PHQ9
SUM OF ALL RESPONSES TO PHQ9 QUESTIONS 1 & 2: 0
SUM OF ALL RESPONSES TO PHQ QUESTIONS 1-9: 0
1. LITTLE INTEREST OR PLEASURE IN DOING THINGS: 0
SUM OF ALL RESPONSES TO PHQ QUESTIONS 1-9: 0
2. FEELING DOWN, DEPRESSED OR HOPELESS: 0

## 2023-02-28 NOTE — PROGRESS NOTES
Subjective:      Patient ID: Mildred Kwon is a 74 y.o. male. Geovanna Ling MD Post-Discharge Transitional Care  Follow Up  57-year-old -American male is presented for postdischarge follow-up. He was admitted for abdominal pain underwent unremarkable cholecystectomy  History of hematuria with underlying bladder stone resolved with surgery. He is established with . He denies any ongoing concern  Diabetes mellitus well controlled to metformin that he is tolerating well. A1c 6.1. Continue current regimen  Hemodynamically stable to Diovan HCT that he is tolerating well. Consume less than 2 g of salt a day  Hyperlipidemia on statin that he is tolerating well  Monofilament testing is unremarkable. He is counseled on diabetic foot care  BPH. Advised to follow-up with . Currently he is not on any respective medications. Family history of prostate cancer with elevated PSA. Advised to follow closely with urology  Once again he declined Tdap, pneumococcal vaccine  He is advised to update annual dilated eye exam  History of modest renal insufficiency with creatinine of 1. Push more oral fluid, avoid nephrotoxic drugs,  He appears anxious heart denies being depressed  He denies ongoing history of tobacco, excessive alcohol or illicit drug use  Med list and available labs reviewed, discussed with patient, questions answered  He is encouraged to call for any concern      Mildred Kwon   YOB: 1950    Date of Office Visit:  2/28/2023  Date of Hospital Admission: 2/15/23  Date of Hospital Discharge: 2/16/23  Risk of hospital readmission (high >=14%.  Medium >=10%) :No data recorded    Care management risk score Rising risk (score 2-5) and Complex Care (Scores >=6): No Risk Score On File     Non face to face  following discharge, date last encounter closed (first attempt may have been earlier): *No documented post hospital discharge outreach found in the last 14 days    Call initiated 2 business days of discharge: *No response recorded in the last 14 days    ASSESSMENT/PLAN:   Hospital discharge follow-up  -     IA DISCHARGE MEDS RECONCILED W/ CURRENT OUTPATIENT MED LIST    Medical Decision Making: high complexity  Return in 3 months (on 5/28/2023). On this date 2/28/2023 I have spent 25 minutes minutes reviewing previous notes, test results and face to face with the patient discussing the diagnosis and importance of compliance with the treatment plan as well as documenting on the day of the visit. Subjective:   HPI:  Follow up of Hospital problems/diagnosis(es):    Diagnosis Orders   1. Hospital discharge follow-up  IA DISCHARGE MEDS RECONCILED W/ CURRENT OUTPATIENT MED LIST      2. Tetanus, diphtheria, and acellular pertussis (Tdap) vaccination declined        3. Pneumococcal vaccination declined        4. Type 2 diabetes mellitus with stage 2 chronic kidney disease, without long-term current use of insulin ([facility] [address]        5. BPH with urinary obstruction        6. Essential hypertension        7. Erectile dysfunction due to arterial insufficiency        8. Family history of prostate cancer        9. BPH with obstruction/lower urinary tract symptoms        10. Adenomatous polyp of colon, unspecified part of colon        11. Dyslipidemia        12. Renal insufficiency        13. Daily consumption of alcohol        14. S/P cholecystectomy            Inpatient course: Discharge summary reviewed- see chart.     Interval history/Current status: Improved    Patient Active Problem List   Diagnosis    Essential hypertension    Elevated PSA    Erectile dysfunction due to arterial insufficiency    Family history of prostate cancer    BPH with obstruction/lower urinary tract symptoms    Adenomatous colon polyp    Dyslipidemia    Daily consumption of alcohol    Renal insufficiency    Tetanus, diphtheria, and acellular pertussis (Tdap) vaccination declined    Pneumococcal vaccination declined Type 2 diabetes, diet controlled ([facility] [address]    Type 2 diabetes mellitus with stage 2 chronic kidney disease, without long-term current use of insulin (HCC)    Gross hematuria    Bladder calculi    BPH with urinary obstruction       Medications listed as ordered at the time of discharge from hospital     Medication List            Accurate as of February 28, 2023 10:12 AM. If you have any questions, ask your nurse or doctor.                 CONTINUE taking these medications      atorvastatin 20 MG tablet  Commonly known as: LIPITOR  TAKE 1 TABLET EVERY DAY     finasteride 5 MG tablet  Commonly known as: PROSCAR  Take 1 tablet by mouth daily     metFORMIN 500 MG tablet  Commonly known as: GLUCOPHAGE  Take 1 tablet by mouth 2 times daily (with meals)     trospium 20 MG tablet  Commonly known as: SANCTURA  Take 1 tablet by mouth 2 times daily (before meals)     valsartan-hydroCHLOROthiazide 160-12.5 MG per tablet  Commonly known as: DIOVAN-HCT  TAKE 1 TABLET EVERY DAY                Medications marked \"taking\" at this time  Outpatient Medications Marked as Taking for the 2/28/23 encounter (Office Visit) with Juan Islas MD   Medication Sig Dispense Refill    trospium (SANCTURA) 20 MG tablet Take 1 tablet by mouth 2 times daily (before meals) 60 tablet 3    metFORMIN (GLUCOPHAGE) 500 MG tablet Take 1 tablet by mouth 2 times daily (with meals) 180 tablet 0    finasteride (PROSCAR) 5 MG tablet Take 1 tablet by mouth daily 90 tablet 3    atorvastatin (LIPITOR) 20 MG tablet TAKE 1 TABLET EVERY DAY 90 tablet 0    valsartan-hydroCHLOROthiazide (DIOVAN-HCT) 160-12.5 MG per tablet TAKE 1 TABLET EVERY DAY 90 tablet 1        Medications patient taking as of now reconciled against medications ordered at time of hospital discharge:          Objective:    BP (!) 110/58 (Site: Left Upper Arm, Position: Sitting, Cuff Size: Medium Adult)   Pulse 100   Temp 97.7 °F (36.5 °C) (Temporal)   Resp 16   Ht 5' 8\" (1.727 m)   Wt 147 lb (66.7 kg)   SpO2 100%   BMI 22.35 kg/m²     This note is created with a voice recognition program and while intend to generate a document that accurately reflects the content of the visit, no guarantee can be provided that every mistake has been identified and corrected by editing. An electronic signature was used to authenticate this note.   --Concha Denver, MD

## 2023-03-01 PROBLEM — N39.0 URINARY TRACT INFECTIOUS DISEASE: Status: ACTIVE | Noted: 2023-03-01

## 2023-03-01 LAB
ALBUMIN SERPL-MCNC: 2.8 G/DL (ref 3.5–5.2)
ALP SERPL-CCNC: 173 U/L (ref 40–129)
ALT SERPL-CCNC: 96 U/L (ref 5–41)
ANION GAP SERPL CALCULATED.3IONS-SCNC: 11 MMOL/L (ref 9–17)
AST SERPL-CCNC: 56 U/L
BILIRUB SERPL-MCNC: 0.6 MG/DL (ref 0.3–1.2)
BUN SERPL-MCNC: 34 MG/DL (ref 8–23)
BUN/CREAT BLD: 31 (ref 9–20)
CALCIUM SERPL-MCNC: 8.9 MG/DL (ref 8.6–10.4)
CHLORIDE SERPL-SCNC: 101 MMOL/L (ref 98–107)
CO2 SERPL-SCNC: 24 MMOL/L (ref 20–31)
CREAT SERPL-MCNC: 1.09 MG/DL (ref 0.7–1.2)
GFR SERPL CREATININE-BSD FRML MDRD: >60 ML/MIN/1.73M2
GLUCOSE SERPL-MCNC: 111 MG/DL (ref 70–99)
HCT VFR BLD AUTO: 36.5 % (ref 40.7–50.3)
HGB BLD-MCNC: 12 G/DL (ref 13–17)
MCH RBC QN AUTO: 29.2 PG (ref 25.2–33.5)
MCHC RBC AUTO-ENTMCNC: 32.9 G/DL (ref 28.4–34.8)
MCV RBC AUTO: 88.8 FL (ref 82.6–102.9)
NRBC AUTOMATED: 0 PER 100 WBC
PDW BLD-RTO: 14.1 % (ref 11.8–14.4)
PLATELET # BLD AUTO: 221 K/UL (ref 138–453)
PMV BLD AUTO: 9.4 FL (ref 8.1–13.5)
POTASSIUM SERPL-SCNC: 4.1 MMOL/L (ref 3.7–5.3)
PROT SERPL-MCNC: 7.3 G/DL (ref 6.4–8.3)
RBC # BLD: 4.11 M/UL (ref 4.21–5.77)
SODIUM SERPL-SCNC: 136 MMOL/L (ref 135–144)
WBC # BLD AUTO: 4.6 K/UL (ref 3.5–11.3)

## 2023-03-01 PROCEDURE — 2580000003 HC RX 258: Performed by: FAMILY MEDICINE

## 2023-03-01 PROCEDURE — 2580000003 HC RX 258: Performed by: EMERGENCY MEDICINE

## 2023-03-01 PROCEDURE — 1200000000 HC SEMI PRIVATE

## 2023-03-01 PROCEDURE — 80053 COMPREHEN METABOLIC PANEL: CPT

## 2023-03-01 PROCEDURE — 6370000000 HC RX 637 (ALT 250 FOR IP): Performed by: FAMILY MEDICINE

## 2023-03-01 PROCEDURE — 36415 COLL VENOUS BLD VENIPUNCTURE: CPT

## 2023-03-01 PROCEDURE — 85027 COMPLETE CBC AUTOMATED: CPT

## 2023-03-01 PROCEDURE — 6360000002 HC RX W HCPCS: Performed by: FAMILY MEDICINE

## 2023-03-01 RX ORDER — SODIUM CHLORIDE 0.9 % (FLUSH) 0.9 %
5-40 SYRINGE (ML) INJECTION EVERY 12 HOURS SCHEDULED
Status: DISCONTINUED | OUTPATIENT
Start: 2023-03-01 | End: 2023-03-02 | Stop reason: SDUPTHER

## 2023-03-01 RX ORDER — VALSARTAN AND HYDROCHLOROTHIAZIDE 160; 12.5 MG/1; MG/1
1 TABLET, FILM COATED ORAL DAILY
Status: DISCONTINUED | OUTPATIENT
Start: 2023-03-01 | End: 2023-03-01

## 2023-03-01 RX ORDER — HYDROCHLOROTHIAZIDE 12.5 MG/1
12.5 TABLET ORAL DAILY
Status: DISCONTINUED | OUTPATIENT
Start: 2023-03-01 | End: 2023-03-03 | Stop reason: HOSPADM

## 2023-03-01 RX ORDER — HEPARIN SODIUM 5000 [USP'U]/ML
5000 INJECTION, SOLUTION INTRAVENOUS; SUBCUTANEOUS EVERY 8 HOURS SCHEDULED
Status: DISCONTINUED | OUTPATIENT
Start: 2023-03-01 | End: 2023-03-03 | Stop reason: HOSPADM

## 2023-03-01 RX ORDER — PANTOPRAZOLE SODIUM 40 MG/1
40 TABLET, DELAYED RELEASE ORAL
Status: DISCONTINUED | OUTPATIENT
Start: 2023-03-02 | End: 2023-03-03 | Stop reason: HOSPADM

## 2023-03-01 RX ORDER — SODIUM CHLORIDE 9 MG/ML
INJECTION, SOLUTION INTRAVENOUS PRN
Status: DISCONTINUED | OUTPATIENT
Start: 2023-03-01 | End: 2023-03-02 | Stop reason: SDUPTHER

## 2023-03-01 RX ORDER — ACETAMINOPHEN 325 MG/1
650 TABLET ORAL EVERY 6 HOURS PRN
Status: DISCONTINUED | OUTPATIENT
Start: 2023-03-01 | End: 2023-03-03 | Stop reason: HOSPADM

## 2023-03-01 RX ORDER — ATORVASTATIN CALCIUM 20 MG/1
20 TABLET, FILM COATED ORAL DAILY
Status: DISCONTINUED | OUTPATIENT
Start: 2023-03-01 | End: 2023-03-03 | Stop reason: HOSPADM

## 2023-03-01 RX ORDER — SODIUM CHLORIDE 0.9 % (FLUSH) 0.9 %
5-40 SYRINGE (ML) INJECTION PRN
Status: DISCONTINUED | OUTPATIENT
Start: 2023-03-01 | End: 2023-03-02 | Stop reason: SDUPTHER

## 2023-03-01 RX ORDER — VALSARTAN 80 MG/1
160 TABLET ORAL DAILY
Status: DISCONTINUED | OUTPATIENT
Start: 2023-03-01 | End: 2023-03-03 | Stop reason: HOSPADM

## 2023-03-01 RX ORDER — FINASTERIDE 5 MG/1
5 TABLET, FILM COATED ORAL DAILY
Status: DISCONTINUED | OUTPATIENT
Start: 2023-03-01 | End: 2023-03-03 | Stop reason: HOSPADM

## 2023-03-01 RX ADMIN — CEFTRIAXONE SODIUM 1000 MG: 1 INJECTION, POWDER, FOR SOLUTION INTRAMUSCULAR; INTRAVENOUS at 23:27

## 2023-03-01 RX ADMIN — SODIUM CHLORIDE: 9 INJECTION, SOLUTION INTRAVENOUS at 08:02

## 2023-03-01 RX ADMIN — ACETAMINOPHEN 650 MG: 325 TABLET ORAL at 15:48

## 2023-03-01 RX ADMIN — HEPARIN SODIUM 5000 UNITS: 5000 INJECTION INTRAVENOUS; SUBCUTANEOUS at 23:25

## 2023-03-01 RX ADMIN — SODIUM CHLORIDE: 9 INJECTION, SOLUTION INTRAVENOUS at 02:28

## 2023-03-01 RX ADMIN — HEPARIN SODIUM 5000 UNITS: 5000 INJECTION INTRAVENOUS; SUBCUTANEOUS at 15:48

## 2023-03-01 ASSESSMENT — PAIN SCALES - GENERAL: PAINLEVEL_OUTOF10: 0

## 2023-03-01 NOTE — ED NOTES
ED to inpatient nurses report     Chief Complaint   Patient presents with    Hand Pain     Pt stated he fell a couple days ago. R hand pain. Swollen and unable to move hand    Groin Pain     Approx 2 weeks post op from prostate surgery      Present to ED from home for evaluation of right hand pain after fall a few days ago. Hand swollen and hard to move. Patient also reports groin pain. Had surgery 2/15 by Dr Rj Marroquin, laparoscopic suprapubic prostatectomy. LOC: alert and orientated to name, place, date  Vital signs   Vitals:    02/28/23 2043 03/01/23 0014   BP: 137/70 136/82   Pulse: (!) 117 100   Resp: 18 16   Temp: 100.1 °F (37.8 °C)    TempSrc: Oral    SpO2: 97% 98%      Oxygen Baseline room air. Current needs required: none. LDAs:   Peripheral IV 02/28/23 Right Forearm (Active)   Site Assessment Clean, dry & intact 02/28/23 2243   Line Status Blood return noted; Flushed;Normal saline locked;Specimen collected 02/28/23 2243   Dressing Status New dressing applied;Clean, dry & intact 02/28/23 2243   Dressing Type Transparent 02/28/23 2243     Mobility: Requires assistance * 1  Fall Risk:    Pending ED orders: none. Present condition: stable. Code Status: FULL   Consults: IP CONSULT TO HOSPITALIST  IP CONSULT TO UROLOGY  [x]  Hospitalist   Completed  [x] yes [] no Who: Dr Jun Chung  []  Medicine  Completed  [] yes [] No Who:   []  Cardiology  Completed  [] yes [] No Who:   []  GI   Completed  [] yes [] No Who:   []  Neurology  Completed  [] yes [] No Who:   []  Nephrology Completed  [] yes [] No Who:    []  Vascular  Completed  [] yes [] No Who:   []  Ortho  Completed  [] yes [] No Who:     []  Surgery  Completed  [] yes [] No Who:    [x]  Urology  Completed  [x] yes [] No Who: [facility]. []  CT Surgery Completed  [] yes [] No Who:   []  Podiatry  Completed  [] yes [] No Who:    []  Other    Completed  [] yes [] No Who:  Interventions: UA positive, rocephin given. Tylenol given for fever. Important Events:      Electronically signed by Meagan Davis RN on 3/1/2023 at 1:40 AM     Meagan Davis RN  03/01/23 [id number]

## 2023-03-01 NOTE — H&P
TIA  History of present illness  77-year-old male with history of BPH status post surgery 2 weeks ago is presented with signs and symptoms and history suggestive of urinary tract infection. Subjective complaint of generalized weakness. Patient states that he fell a few days ago unable to get up. He sustained injury to the left hand however radiologically there is no apparent fracture. He denies head and neck injury. He denies fever chills abdominal pain back pain hematuria or dysuria  Past surgical history  BPH  Dyslipidemia  Daily consumption of alcohol surgical history  2 weeks status post prostate surgery  History of cholecystectomy  Medications per list reviewed  Allergies per list reviewed  Social history he consumes alcohol daily however denies being alcoholic. Review of system all 12 systems reviewed but  History of present illness  Vitals  Afebrile hemodynamically stable  Systemic exam  HEENT normocephalic atraumatic, PERRLA, no conjunctival injection, bilateral pupils 3 mm each no lid lag lid retraction or nystagmus. No facial asymmetry. Oral examination is unremarkable with moist and pink mucosa, uvula central, no tongue deviation, gag reflex intact, oral airway patent  Neck anatomical lordotic curvature, full active range of motion without any restriction, trachea central no JVD or carotid bruits  Lungs bilateral CTA  Chest no tenderness or asymmetry. CVS S1-S2 regular rate and rhythm  Abdomen soft discomfort benign to palpation normoactive bowel sound  CNS awake alert and alert x3, cranial nerves II through XII grossly intact  Lower extremity no calf tenderness, good capillary refill  Right lower extremity shows modest dorsal swelling of the right hand. Neurologically intact.   No apparent deformity  Labs     Latest Reference Range & Units Most Recent 3/1/23 08:31   Sodium 135 - 144 mmol/L 136  3/1/23 08:31 136   Potassium 3.7 - 5.3 mmol/L 4.1  3/1/23 08:31 4.1   Chloride 98 - 107 mmol/L 101  3/1/23 08:31 101   CO2 20 - 31 mmol/L 24  3/1/23 08:31 24   BUN,BUNPL 8 - 23 mg/dL 34 (H)  3/1/23 08:31 34 (H)   Creatinine 0.70 - 1.20 mg/dL 1.09  3/1/23 08:31 1.09   Bun/Cre Ratio 9 - 20  31 (H)  3/1/23 08:31 31 (H)   Anion Gap 9 - 17 mmol/L 11  3/1/23 08:31 11   Est, Glom Filt Rate >60 mL/min/1.73m2 >60  3/1/23 08:31 >60   GFR Non-African American >60 mL/min >60  5/13/22 08:32    GFR  >60 mL/min >60  5/13/22 08:32    Lactic Acid 0.5 - 2.2 mmol/L 1.3  2/28/23 23:37    Glucose, Random 70 - 99 mg/dL 111 (H)  3/1/23 08:31 111 (H)   POC Glucose 75 - 110 mg/dL 96  12/1/22 15:01    CALCIUM, SERUM, 301781 8.6 - 10.4 mg/dL 8.9  3/1/23 08:31 8.9   ALBUMIN/GLOBULIN RATIO 1.0 - 2.5  1.4  5/13/22 08:32    Total Protein 6.4 - 8.3 g/dL 7.3  3/1/23 08:31 7.3   GFR Comment        5/13/22 08:32    GFR Staging  NOT REPORTED  12/15/21 09:22    QC OK?  ok  12/1/22 15:02    Chol/HDL Ratio <5  4.4  12/15/21 09:22    Cholesterol <200 mg/dL 157  6/1/18 12:10    Cholesterol, Fasting <200 mg/dL 159  12/15/21 09:22    HDL Cholesterol >40 mg/dL 36 (L)  12/15/21 09:22    LDL Cholesterol 0 - 130 mg/dL 101  12/15/21 09:22    Triglycerides <150 mg/dL 195 (H)  6/1/18 12:10    VLDL 1 - 30 mg/dL NOT REPORTED  12/15/21 09:22    Triglyceride, Fasting <150 mg/dL 111  12/15/21 09:22    (H): Data is abnormally high  (L): Data is abnormally low     Latest Reference Range & Units Most Recent 3/1/23 08:31   Albumin 3.5 - 5.2 g/dL 2.8 (L)  3/1/23 08:31 2.8 (L)   Globulin 1.5 - 3.8 g/dL NOT REPORTED  12/1/15 13:38    ALBUMIN/GLOBULIN RATIO 1.0 - 2.5  1.4  5/13/22 08:32    Alk Phos 40 - 129 U/L 173 (H)  3/1/23 08:31 173 (H)   ALT 5 - 41 U/L 96 (H)  3/1/23 08:31 96 (H)   AST <40 U/L 56 (H)  3/1/23 08:31 56 (H)   BILIRUBIN TOTAL 0.3 - 1.2 mg/dL 0.6  3/1/23 08:31 0.6   Bilirubin, Direct <0.31 mg/dL <0.08  5/13/22 08:32    Bilirubin, Indirect 0.00 - 1.00 mg/dL Can not be calculated  5/13/22 08:32    Total Protein 6.4 - 8.3 g/dL 7.3  3/1/23 08: 31 7.3   Triglycerides <150 mg/dL 195 (H)  6/1/18 12:10    Glucose, Random 70 - 99 mg/dL 111 (H)  3/1/23 08:31 111 (H)   Glucose, Fasting 70 - 99 mg/dL 91  12/15/21 09:22    Hemoglobin A1C 4.0 - 6.0 % 6.2 (H)  11/30/22 09:06    eAG (mg/dL) mg/dL 131  11/30/22 09:06    POC Glucose 75 - 110 mg/dL 96  12/1/22 15:01    TSH 0.30 - 5.00 mIU/L 1.98  12/15/21 09:22    WBC 3.5 - 11.3 k/uL 4.6  3/1/23 08:31 4.6   RBC 4.21 - 5.77 m/uL 4.11 (L)  3/1/23 08:31 4.11 (L)   Hemoglobin Quant 13.0 - 17.0 g/dL 12.0 (L)  3/1/23 08:31 12.0 (L)   Hematocrit 40.7 - 50.3 % 36.5 (L)  3/1/23 08:31 36.5 (L)   MCV 82.6 - 102.9 fL 88.8  3/1/23 08:31 88.8   MCH 25.2 - 33.5 pg 29.2  3/1/23 08:31 29.2   MCHC 28.4 - 34.8 g/dL 32.9  3/1/23 08:31 32.9   MPV 8.1 - 13.5 fL 9.4  3/1/23 08:31 9.4   RDW 11.8 - 14.4 % 14.1  3/1/23 08:31 14.1   Platelet Count 862 - 453 k/uL 221  3/1/23 08:31 221   Platelet Estimate  NOT REPORTED  6/1/18 12:10    Absolute Mono # 0.2 - 0.8 k/uL 0.41  2/28/23 22:30    (L): Data is abnormally low  (H): Data is abnormally high     Latest Reference Range & Units Most Recent   Color, UA Yellow  Yellow  2/28/23 22:20   Turbidity UA Clear  Cloudy !   2/28/23 22:20   Glucose, UA NEGATIVE  NEGATIVE  2/28/23 22:20   Bilirubin, Urine NEGATIVE  NEGATIVE  2/28/23 22:20   Ketones, Urine NEGATIVE  NEGATIVE  2/28/23 22:20   Specific Gravity, UA 1.005 - 1.030  1.015  2/28/23 22:20   pH, UA 5.0 - 8.0  6.0  2/28/23 22:20   Protein, UA NEGATIVE  2+ !  2/28/23 22:20   Urobilinogen, Urine Normal  Normal  2/28/23 22:20   Nitrite, Urine NEGATIVE  POSITIVE !  2/28/23 22:20   Leukocyte Esterase, Urine NEGATIVE  MOD !  2/28/23 22:20   Color, UA  yellow  12/19/22 14:52   Clarity, UA  cloudy  12/19/22 14:52   Glucose, UA POC  neg  12/19/22 14:52   Nitrite, UA  neg  12/19/22 14:52   Leukocytes, UA  neg  12/19/22 14:52   Urinalysis Comments  Microscopic exam not performed based on chemical results unless requested in  6/1/18 12:10   WBC, UA 0 - 5 /HPF 20 TO 50  2/28/23 22:20   RBC, UA 0 - 2 /HPF 10 TO 20  2/28/23 22:20   Blood, UA POC  neg  12/19/22 14:52   Epithelial Cells, UA 0 - 5 /HPF 0 TO 2  2/28/23 22:20   Bacteria, UA None  MANY ! 2/28/23 22:20   Urine Hgb NEGATIVE  3+ !  2/28/23 22:20   !: Data is abnormal  FINDINGS:   No fracture or dislocation is seen. The joint spaces are intact. The carpal   bones are intact. Impression   No abnormality seen. Assessment and plan  2 weeks status post prostate surgery. Patient presented with signs and symptoms of UTI with acute renal failure. He is being hydrated. Continue Rocephin. He is afebrile hemodynamically stable. Avoid nephrotoxic drugs, anti-inflammatory radiocontrast  He is pending evaluation by   Posttraumatic left hand pain. X-rays negative for any obvious fracture. Continue ice pack, Ace wrap. Encourage range of motion exercises  Daily consumption of alcohol. However denies being depressed  Plan of care discussed with patient and the nursing staff  We will follow along  [facility] will cover for me starting tomorrow morning. Discussed with RN. This note is created with a voice recognition program and while intend to generate a document that accurately reflects the content of the visit, no guarantee can be provided that every mistake has been identified and corrected by editing.

## 2023-03-01 NOTE — CARE COORDINATION
03/01/23 1309   Service Assessment   Patient Orientation Alert and Oriented;Person;Place;Situation;Self   Cognition Alert   History Provided By Patient   Primary [address]   Patient's Healthcare Decision Maker is: Legal Next of Kin   PCP Verified by CM Yes   Last Visit to PCP Within last 3 months   Prior Functional Level Independent in ADLs/IADLs   Current Functional Level Independent in ADLs/IADLs   Can patient return to prior living arrangement Yes   Ability to make needs known: Fair   Family able to assist with home care needs: Yes   Would you like for me to discuss the discharge plan with any other family members/significant others, and if so, who? No   Financial Resources None   Community Resources None   Social/Functional History   Lives With Alone   Type of Home House   Home Layout Multi-level   Home Access Stairs to enter without rails   Entrance Stairs - Number of Steps 2 steps   Bathroom Shower/Tub Tub/Shower unit   Bathroom Toilet Standard   Bathroom Accessibility Accessible   Receives Help From Family   ADL Assistance Independent   Homemaking Assistance Independent   Ambulation Assistance Independent   Transfer Assistance Independent   Active  Yes   Mode of Transportation Car   Discharge Planning   Type of [place] Alone   Current Services Prior To Admission None   Potential Assistance Needed N/A   DME Ordered? No   Type of Home Care Services None   Patient expects to be discharged to: House   Follow Up Appointment: Best Day/Time  Monday AM   History of falls? 0   Services At/After Discharge   Services At/After Discharge None   [employer] Information Provided? No   Mode of Transport at Discharge Other (see comment)  (daughter)   Confirm Follow Up Transport Family   Condition of Participation: Discharge Planning   The Plan for Transition of Care is related to the following treatment goals: plan is home independently.  Has family support. No DME use. The Patient and/or Patient Representative was provided with a Choice of Provider? Patient   The Patient and/Or Patient Representative agree with the Discharge Plan? Yes   Freedom of Choice list was provided with basic dialogue that supports the patient's individualized plan of care/goals, treatment preferences, and shares the quality data associated with the providers? Yes   UTI. Acute cystitis. No DME. Declines any skilled needs. PT to be ordered. Had prostate surg. 2 weeks ago. Continue to follow. Uses [facility] [address].

## 2023-03-01 NOTE — ED PROVIDER NOTES
[address] [place] [address] ED  EMERGENCY DEPARTMENT ENCOUNTER      Pt Name: Liberty Kunz  MRN: [id number]  [place] 1950  Date of evaluation: 2/28/2023  Provider: Alicia Gilliland MD    CHIEF COMPLAINT       Chief Complaint   Patient presents with    Hand Pain     Pt stated he fell a couple days ago. R hand pain. Swollen and unable to move hand    Groin Pain     Approx 2 weeks post op from prostate surgery         HISTORY OF PRESENT ILLNESS  (Location/Symptom, Timing/Onset, Context/Setting, Quality, Duration, Modifying Factors, Severity.)   Liberty Kunz is a 74 y.o. male who presents to the emergency department for a chief complaint of right hand pain and right groin pain. The patient is a poor historian. He is accompanied by 2 daughters to give much of the history. He lives by himself. They checked on him today and he could not get himself up, he was on the floor. He states he did not fall. He does not have a headache. 2 weeks ago he had prostate surgery. His daughter states that he has been doing more around the home than he supposed to. It seems that the groin pain started 2 weeks ago after the surgery. They state he could have fallen and they might not know it. He was noted to have a fever at triage    Nursing Notes were reviewed. ALLERGIES     Patient has no known allergies.     CURRENT MEDICATIONS       Previous Medications    ATORVASTATIN (LIPITOR) 20 MG TABLET    TAKE 1 TABLET EVERY DAY    FINASTERIDE (PROSCAR) 5 MG TABLET    Take 1 tablet by mouth daily    METFORMIN (GLUCOPHAGE) 500 MG TABLET    Take 1 tablet by mouth 2 times daily (with meals)    TROSPIUM (SANCTURA) 20 MG TABLET    Take 1 tablet by mouth 2 times daily (before meals)    VALSARTAN-HYDROCHLOROTHIAZIDE (DIOVAN-HCT) 160-12.5 MG PER TABLET    TAKE 1 TABLET EVERY DAY       PAST MEDICAL HISTORY         Diagnosis Date    Adenomatous colon polyp 07/2016    Caffeine use     2 coffee & 1 cola / day    History of colon polyps 07/2016 Hyperlipidemia     Hypertension     Pre-diabetes        SURGICAL HISTORY           Procedure Laterality Date    COLONOSCOPY  07/15/2016    adenomatous polyp tubular type    PRE-MALIGNANT / BENIGN SKIN LESION EXCISION Left 2003    soft tissue mass on shoulder    PROSTATE BIOPSY  2016    PROSTATE SURGERY  02/15/2023    SUPRAPUBIC PARTIAL PROSTATECTOMY LAPAROSCOPIC ROBOTIC WITH REMOVAL OF BLADDER CALCULI    PROSTATECTOMY N/A 2/15/2023    SUPRAPUBIC PROSTATECTOMY LAPAROSCOPIC ROBOTIC WITH REMOVAL OF BLADDER CALCULI performed by Nieves Sarah MD at [facility] OR         FAMILY HISTORY           Problem Relation Age of Onset    Heart Attack Mother     Prostate Cancer Father      Family Status   Relation Name Status    Mother  (Not Specified)    Father  (Not Specified)        SOCIAL HISTORY      reports that he has never smoked. He has been exposed to tobacco smoke. He has never used smokeless tobacco. He reports current alcohol use of about 2.0 standard drinks per week. He reports that he does not use drugs. REVIEW OF SYSTEMS    (2-9 systems for level 4, 10 or more for level 5)     Review of Systems   Constitutional:  Negative for chills, fatigue and fever. HENT:  Negative for congestion, ear discharge and facial swelling. Eyes:  Negative for discharge and redness. Respiratory:  Negative for cough and shortness of breath. Cardiovascular:  Negative for chest pain. Gastrointestinal:  Negative for abdominal pain, constipation, diarrhea and vomiting. Genitourinary:  Negative for dysuria, flank pain and hematuria. Musculoskeletal:  Negative for arthralgias. Skin:  Negative for color change and rash. Neurological:  Positive for weakness. Negative for syncope, numbness and headaches. Hematological:  Negative for adenopathy. Psychiatric/Behavioral:  Negative for confusion. The patient is not nervous/anxious.        Except as noted above the remainder of the review of systems was reviewed and negative. PHYSICAL EXAM    (up to 7 for level 4, 8 or more for level 5)     Vitals:    02/28/23 2043   BP: 137/70   Pulse: (!) 117   Resp: 18   Temp: 100.1 °F (37.8 °C)   TempSrc: Oral   SpO2: 97%       Physical Exam  Vitals reviewed. Constitutional:       General: He is not in acute distress. Appearance: He is well-developed. He is not diaphoretic. HENT:      Head: Normocephalic and atraumatic. Eyes:      General: No scleral icterus. Right eye: No discharge. Left eye: No discharge. Cardiovascular:      Rate and Rhythm: Normal rate and regular rhythm. Pulmonary:      Effort: Pulmonary effort is normal. No respiratory distress. Breath sounds: Normal breath sounds. No stridor. No wheezing or rales. Abdominal:      General: There is no distension. Palpations: Abdomen is soft. Tenderness: There is no abdominal tenderness. Musculoskeletal:         General: Normal range of motion. Cervical back: Neck supple. Lymphadenopathy:      Cervical: No cervical adenopathy. Skin:     General: Skin is warm and dry. Findings: No erythema or rash. Neurological:      Mental Status: He is alert and oriented to person, place, and time. Comments: He is able to lift each leg up off the bed individually and its equal.  He is able to lift each arm up off the bed and its equal.  The right hand is mildly swollen. No erythema bruise or break in the skin.    Psychiatric:         Behavior: Behavior normal.           DIAGNOSTIC RESULTS     EKG: All EKG's are interpreted by the Emergency Department Physician who either signs or Co-signs this chart in the absence of a cardiologist.    RADIOLOGY:   Non-plain film images such as CT, Ultrasound and MRI are read by the radiologist. Plain radiographic images are visualized and preliminarily interpreted by the emergency physician with the below findings:    Interpretation per the Radiologist below, if available at the time of this note:    XR WRIST RIGHT (MIN 3 VIEWS)    Result Date: 2/28/2023  EXAMINATION: 3 XRAY VIEWS OF THE RIGHT WRIST 2/28/2023 10:45 pm COMPARISON: None. HISTORY: ORDERING SYSTEM PROVIDED HISTORY: Pain TECHNOLOGIST PROVIDED HISTORY: Pain Reason for Exam: pain after fall on sunday FINDINGS: The radius and ulna are intact. No fracture or dislocation is seen. The carpal bones are intact and in good position. No acute abnormality seen. XR HAND RIGHT (MIN 3 VIEWS)    Result Date: 2/28/2023  EXAMINATION: THREE XRAY VIEWS OF THE RIGHT HAND 2/28/2023 10:45 pm COMPARISON: None. HISTORY: ORDERING SYSTEM PROVIDED HISTORY: Pain TECHNOLOGIST PROVIDED HISTORY: Pain Reason for Exam: pain after fall on sunday FINDINGS: No fracture or dislocation is seen. The joint spaces are intact. The carpal bones are intact. No abnormality seen. CT HEAD WO CONTRAST    No evidence of intracranial hemorrhage or any other definable acute intracranial abnormality. There is no definable focal abnormality or acute process in brain. XR CHEST PORTABLE    Result Date: 2/28/2023  EXAMINATION: ONE XRAY VIEW OF THE CHEST 2/28/2023 10:45 pm COMPARISON: None. HISTORY: ORDERING SYSTEM PROVIDED HISTORY: weak TECHNOLOGIST PROVIDED HISTORY: weak Reason for Exam: pain after fall on sunday FINDINGS: The heart is normal.  The pulmonary vessels are normal.  The lungs are mildly hyperinflated. No consolidation or effusion is seen. Bones are intact. Mild hyperinflation with no acute pulmonary abnormality. XR HIP 2-3 VW W PELVIS RIGHT    Result Date: 2/28/2023  EXAMINATION: ONE XRAY VIEW OF THE PELVIS AND TWO XRAY VIEWS RIGHT HIP 2/28/2023 10:45 pm COMPARISON: None. HISTORY: ORDERING SYSTEM PROVIDED HISTORY: pain TECHNOLOGIST PROVIDED HISTORY: pain Reason for Exam: pain after fall on sunday FINDINGS: There is mild joint space narrowing symmetrically in both hips. No fracture or dislocation is seen. The pelvis is intact.      Mild osteoarthritic changes in both hips with no acute bony abnormality. LABS:  Labs Reviewed   CBC WITH AUTO DIFFERENTIAL - Abnormal; Notable for the following components:       Result Value    Hemoglobin 12.2 (*)     Hematocrit 36.9 (*)     Seg Neutrophils 81 (*)     Lymphocytes 12 (*)     Eosinophils % 0 (*)     Absolute Lymph # 0.71 (*)     All other components within normal limits   BASIC METABOLIC PANEL - Abnormal; Notable for the following components:    Glucose 135 (*)     BUN 52 (*)     Creatinine 1.37 (*)     Est, Glom Filt Rate 54 (*)     Bun/Cre Ratio 38 (*)     Sodium 131 (*)     Chloride 93 (*)     All other components within normal limits   URINALYSIS - Abnormal; Notable for the following components:    Turbidity UA Cloudy (*)     Urine Hgb 3+ (*)     Protein, UA 2+ (*)     Nitrite, Urine POSITIVE (*)     Leukocyte Esterase, Urine MOD (*)     All other components within normal limits   MICROSCOPIC URINALYSIS - Abnormal; Notable for the following components:    Bacteria, UA MANY (*)     All other components within normal limits   COVID-19, RAPID   CULTURE, BLOOD 1   CULTURE, BLOOD 1   CULTURE, URINE   LACTIC ACID       All other labs were within normal range or not returned as of this dictation. EMERGENCY DEPARTMENT COURSE and DIFFERENTIAL DIAGNOSIS/MDM:   Vitals:    Vitals:    02/28/23 2043   BP: 137/70   Pulse: (!) 117   Resp: 18   Temp: 100.1 °F (37.8 °C)   TempSrc: Oral   SpO2: 97%       Orders Placed This Encounter   Medications    0.9 % sodium chloride infusion    cefTRIAXone (ROCEPHIN) 1,000 mg in sodium chloride 0.9 % 50 mL IVPB (mini-bag)     Order Specific Question:   Antimicrobial Indications     Answer:   Urinary Tract Infection    acetaminophen (TYLENOL) tablet 650 mg       HEART SCORE: Not applicable    Medical Decision Making: Urinary tract infection is identified. WBC is normal at 5.9. Lactic acid is normal at 1.3. BUN and creatinine are elevated slightly above his baseline.   He was given IV fluids and Tylenol orally and IV Rocephin and urine culture was ordered. He is being admitted. Findings are discussed thoroughly with his daughters. Evaluation and treatment course in the ED, and plan of care upon mission was discussed in length with the patient. DDx include hand fracture, hand sprain, pneumonia, UTI, COVID      I will order labs including CBC, BMP, urinalysis, urine culture, blood cultures, lactic acid, complete CXR and monitor closely. Test considered, but not ordered: None    The patient was involved in his/her plan of care. The testing that was ordered was discussed with the patient. Any medications that may have been ordered were discussed with the patient. I have reviewed the patient's previous medical records using the electronic health record that we have available. Labs and imaging were reviewed. I have discusssed recommendation for admission with the patient and/or family. We have discussed benefits of admission and the risks of discharge home. The patient agrees with the plan of care and admission. The patient will be admitted for further evaluation and treatment. I have consulted urology and Dr Heather Laguerre. The patient will be admitted to Dr Heather Laguerre, he agrees to accept the patient for further evaluation and treatment. CONSULTS:  IP CONSULT TO HOSPITALIST  IP CONSULT TO UROLOGY    PROCEDURES:  None    FINAL IMPRESSION      1. Acute cystitis without hematuria          DISPOSITION/PLAN   DISPOSITION Decision To Admit 03/01/2023 12:02:35 AM      PATIENT REFERRED TO:   No follow-up provider specified. DISCHARGE MEDICATIONS:     New Prescriptions    No medications on file       The care is provided during an unprecedented national emergency due to the novel coronavirus, COVID-19.     (Please note that portions of this note were completed with a voice recognition program.  Efforts were made to edit the dictations but occasionally words are mis-transcribed.)    Josh Acharya MD  Attending Emergency Physician           Josh Acharya MD  03/01/23 0005

## 2023-03-01 NOTE — PROGRESS NOTES
Pt admitted to room 2105 per cart in stable condition from ED  Oriented to room and surroundings  Bed in lowest position, wheels locked, 2/4 side rails up  Call light in reach, room free of clutter, adequate lighting provided  Denies any further questions at this time  Instructed to call out with any questions/concerns/new onset of pain and/or n/v   White board updated  Continue to monitor with hourly rounding  Bed alarm on/Fall Risk signs in place/Fall risk sticker to wrist band  Non-skid socks on/at bedside

## 2023-03-01 NOTE — CONSULTS
[facility]ica [facility]  Urology Consultation    Patient:  Batool Dover  MRN: [id number]  YOB: 1950    CHIEF COMPLAINT: Patient seen on behalf of Dr. Jin Gotti       Concern about UTI  HISTORY OF PRESENT ILLNESS:   The patient is a 74 y.o. male who presents with history of fall and injury to the hand the patient presented to the emergency room upon evaluation he was found to have evidence of pyuria and microhematuria in the urine sample. Urology consulted, this patient has had robotic simple prostatectomy performed 2 weeks ago procedure was uneventful    Patient's old records, notes and chart reviewed and summarized above.     Past Medical History:    Past Medical History:   Diagnosis Date    Adenomatous colon polyp 07/2016    Caffeine use     2 coffee & 1 cola / day    History of colon polyps 07/2016    Hyperlipidemia     Hypertension     Pre-diabetes        Past Surgical History:    Past Surgical History:   Procedure Laterality Date    COLONOSCOPY  07/15/2016    adenomatous polyp tubular type    PRE-MALIGNANT / BENIGN SKIN LESION EXCISION Left 2003    soft tissue mass on shoulder    PROSTATE BIOPSY  2016    PROSTATE SURGERY  02/15/2023    SUPRAPUBIC PARTIAL PROSTATECTOMY LAPAROSCOPIC ROBOTIC WITH REMOVAL OF BLADDER CALCULI    PROSTATECTOMY N/A 2/15/2023    SUPRAPUBIC PROSTATECTOMY LAPAROSCOPIC ROBOTIC WITH REMOVAL OF BLADDER CALCULI performed by Gregorio Ward MD at [facility] OR     Previous  surgery:  Robotic simple prostatectomy suprapubic      Medications:    Scheduled Meds:   sodium chloride flush  5-40 mL IntraVENous 2 times per day    cefTRIAXone (ROCEPHIN) IV  1,000 mg IntraVENous Q24H    heparin (porcine)  5,000 Units SubCUTAneous 3 times per day    [START ON 3/2/2023] pantoprazole  40 mg Oral QAM AC     Continuous Infusions:   sodium chloride      sodium chloride 75 mL/hr at 03/01/23 1604     PRN Meds:.sodium chloride flush, sodium chloride, acetaminophen    Allergies: Patient has no known allergies. Social History:    Social History     Socioeconomic History    Marital status:      Spouse name: Not on file    Number of children: 4    Years of education: Not on file    Highest education level: Not on file   Occupational History    Occupation: Retired   Tobacco Use    Smoking status: Never     Passive exposure: Past    Smokeless tobacco: Never   Vaping Use    Vaping Use: Never used   Substance and Sexual Activity    Alcohol use: Not Currently    Drug use: No    Sexual activity: Yes   Other Topics Concern    Not on file   Social History Narrative    Not on file     Social Determinants of Health     Financial Resource Strain: Low Risk     Difficulty of Paying Living Expenses: Not hard at all   Food Insecurity: No Food Insecurity    Worried About Running Out of Food in the Last Year: Never true    [address] in the Last Year: Never true   Transportation Needs: Unknown    Lack of Transportation (Medical): Not on file    Lack of Transportation (Non-Medical):  No   Physical Activity: Sufficiently Active    Days of Exercise per Week: 7 days    Minutes of Exercise per Session: 30 min   Stress: Not on file   Social Connections: Not on file   Intimate Partner Violence: Not on file   Housing Stability: Unknown    Unable to Pay for Housing in the Last Year: Not on file    Number of Places Lived in the Last Year: Not on file    Unstable Housing in the Last Year: No       Family History:    Family History   Problem Relation Age of Onset    Heart Attack Mother     Prostate Cancer Father      Previous Urologic Family history: none    REVIEW OF SYSTEMS:  Constitutional: negative  Eyes: negative  Respiratory: negative  Cardiovascular: negative  Gastrointestinal: negative  Genitourinary: see HPI  Musculoskeletal: See history of present illness  Skin: negative   Neurological: negative  Hematological/Lymphatic: negative  Psychological: negative    Physical Exam:    This a 74 y.o. male Patient Vitals for the past 24 hrs:   BP Temp Temp src Pulse Resp SpO2 Height Weight   03/01/23 1502 117/63 (!) 101.3 °F (38.5 °C) Oral 88 18 95 % -- --   03/01/23 1106 116/61 99.5 °F (37.5 °C) Oral 91 21 97 % -- --   03/01/23 0628 (!) 144/74 98.1 °F (36.7 °C) -- 91 18 98 % -- --   03/01/23 0209 125/67 97.7 °F (36.5 °C) Oral 85 18 94 % 5' 8\" (1.727 m) 144 lb 12.8 oz (65.7 kg)   03/01/23 0014 136/82 -- -- 100 16 98 % -- --   02/28/23 2043 137/70 100.1 °F (37.8 °C) Oral (!) 117 18 97 % -- --     Constitutional: Patient in no acute distress; Neuro: alert and oriented to person place and time. Psych: Mood and affect normal.  Skin: Normal  Lungs: Respiratory effort normal  Cardiovascular:  Normal peripheral pulses  Abdomen: Soft, non-tender, non-distended with no CVA, flank pain, hepatosplenomegaly or hernia. Kidneys normal.  Bladder non-tender and not distended.   Lymphatics: no palpable lymphadenopathy  Penis normal and circumcised  Urethral meatus normal  Patient voiding spontaneously  LABS:  Recent Labs     02/28/23 2230 03/01/23  0831   WBC 5.9 4.6   HGB 12.2* 12.0*   HCT 36.9* 36.5*   MCV 87.4 88.8    221     Recent Labs     02/28/23 2230 03/01/23  0831   * 136   K 4.0 4.1   CL 93* 101   CO2 25 24   BUN 52* 34*   CREATININE 1.37* 1.09     Lab Results   Component Value Date    PSA 13.57 12/20/2022    PSA 11.38 (H) 12/15/2021    PSA 7.40 (H) 06/01/2018       Additional Lab/culture results:    Urinalysis:   Recent Labs     02/28/23 2220   COLORU Yellow   PHUR 6.0   WBCUA 20 TO 50   RBCUA 10 TO 20   BACTERIA MANY*   SPECGRAV 1.015   LEUKOCYTESUR MOD*   UROBILINOGEN Normal   BILIRUBINUR NEGATIVE        -----------------------------------------------------------------  Imaging Results: CT abdomen pelvis pending    Assessment and Plan   Impression: Lower urinary tract symptoms, pyuria and microhematuria awaiting culture results  Patient Active Problem List   Diagnosis    Essential hypertension Erectile dysfunction due to arterial insufficiency    Family history of prostate cancer    BPH with obstruction/lower urinary tract symptoms    Adenomatous colon polyp    Dyslipidemia    Daily consumption of alcohol    Renal insufficiency    Tetanus, diphtheria, and acellular pertussis (Tdap) vaccination declined    Type 2 diabetes, diet controlled ([facility] [address]    Type 2 diabetes mellitus with stage 2 chronic kidney disease, without long-term current use of insulin (HCC)    BPH with urinary obstruction    S/P cholecystectomy    Urinary tract infectious disease       Plan: Patient presently doing well, we will follow along, CT scan pending,    Burgess Pola MD  4:31 PM 3/1/2023

## 2023-03-02 ENCOUNTER — APPOINTMENT (OUTPATIENT)
Dept: CT IMAGING | Age: 73
End: 2023-03-02
Payer: MEDICARE

## 2023-03-02 PROBLEM — R74.8 ELEVATED LIVER ENZYMES: Status: ACTIVE | Noted: 2023-03-02

## 2023-03-02 LAB
ALBUMIN SERPL-MCNC: 2.6 G/DL (ref 3.5–5.2)
ALP SERPL-CCNC: 205 U/L (ref 40–129)
ALT SERPL-CCNC: 122 U/L (ref 5–41)
ANION GAP SERPL CALCULATED.3IONS-SCNC: 9 MMOL/L (ref 9–17)
AST SERPL-CCNC: 105 U/L
BILIRUB SERPL-MCNC: 0.4 MG/DL (ref 0.3–1.2)
BUN SERPL-MCNC: 18 MG/DL (ref 8–23)
BUN/CREAT BLD: 20 (ref 9–20)
CALCIUM SERPL-MCNC: 8.4 MG/DL (ref 8.6–10.4)
CHLORIDE SERPL-SCNC: 104 MMOL/L (ref 98–107)
CO2 SERPL-SCNC: 23 MMOL/L (ref 20–31)
CREAT SERPL-MCNC: 0.91 MG/DL (ref 0.7–1.2)
GFR SERPL CREATININE-BSD FRML MDRD: >60 ML/MIN/1.73M2
GLUCOSE SERPL-MCNC: 114 MG/DL (ref 70–99)
HCT VFR BLD AUTO: 33.5 % (ref 40.7–50.3)
HGB BLD-MCNC: 10.9 G/DL (ref 13–17)
MCH RBC QN AUTO: 28.7 PG (ref 25.2–33.5)
MCHC RBC AUTO-ENTMCNC: 32.5 G/DL (ref 28.4–34.8)
MCV RBC AUTO: 88.2 FL (ref 82.6–102.9)
MICROORGANISM SPEC CULT: ABNORMAL
MICROORGANISM SPEC CULT: ABNORMAL
NRBC AUTOMATED: 0 PER 100 WBC
PDW BLD-RTO: 14.3 % (ref 11.8–14.4)
PLATELET # BLD AUTO: 218 K/UL (ref 138–453)
PMV BLD AUTO: 9.1 FL (ref 8.1–13.5)
POTASSIUM SERPL-SCNC: 4.1 MMOL/L (ref 3.7–5.3)
PROT SERPL-MCNC: 6.4 G/DL (ref 6.4–8.3)
RBC # BLD: 3.8 M/UL (ref 4.21–5.77)
SODIUM SERPL-SCNC: 136 MMOL/L (ref 135–144)
SPECIMEN DESCRIPTION: ABNORMAL
WBC # BLD AUTO: 4.1 K/UL (ref 3.5–11.3)

## 2023-03-02 PROCEDURE — 2580000003 HC RX 258: Performed by: FAMILY MEDICINE

## 2023-03-02 PROCEDURE — 1200000000 HC SEMI PRIVATE

## 2023-03-02 PROCEDURE — 6370000000 HC RX 637 (ALT 250 FOR IP): Performed by: INTERNAL MEDICINE

## 2023-03-02 PROCEDURE — 99231 SBSQ HOSP IP/OBS SF/LOW 25: CPT | Performed by: INTERNAL MEDICINE

## 2023-03-02 PROCEDURE — 85027 COMPLETE CBC AUTOMATED: CPT

## 2023-03-02 PROCEDURE — 6360000002 HC RX W HCPCS: Performed by: FAMILY MEDICINE

## 2023-03-02 PROCEDURE — 36415 COLL VENOUS BLD VENIPUNCTURE: CPT

## 2023-03-02 PROCEDURE — 6370000000 HC RX 637 (ALT 250 FOR IP): Performed by: FAMILY MEDICINE

## 2023-03-02 PROCEDURE — 80053 COMPREHEN METABOLIC PANEL: CPT

## 2023-03-02 PROCEDURE — 74176 CT ABD & PELVIS W/O CONTRAST: CPT

## 2023-03-02 RX ORDER — LORAZEPAM 2 MG/ML
3 INJECTION INTRAMUSCULAR
Status: DISCONTINUED | OUTPATIENT
Start: 2023-03-02 | End: 2023-03-03 | Stop reason: HOSPADM

## 2023-03-02 RX ORDER — LORAZEPAM 1 MG/1
3 TABLET ORAL
Status: DISCONTINUED | OUTPATIENT
Start: 2023-03-02 | End: 2023-03-03 | Stop reason: HOSPADM

## 2023-03-02 RX ORDER — LORAZEPAM 2 MG/ML
2 INJECTION INTRAMUSCULAR
Status: DISCONTINUED | OUTPATIENT
Start: 2023-03-02 | End: 2023-03-03 | Stop reason: HOSPADM

## 2023-03-02 RX ORDER — LORAZEPAM 1 MG/1
2 TABLET ORAL
Status: DISCONTINUED | OUTPATIENT
Start: 2023-03-02 | End: 2023-03-03 | Stop reason: HOSPADM

## 2023-03-02 RX ORDER — MULTIVITAMIN WITH IRON
1 TABLET ORAL DAILY
Status: DISCONTINUED | OUTPATIENT
Start: 2023-03-02 | End: 2023-03-03 | Stop reason: HOSPADM

## 2023-03-02 RX ORDER — GAUZE BANDAGE 2" X 2"
100 BANDAGE TOPICAL DAILY
Status: DISCONTINUED | OUTPATIENT
Start: 2023-03-02 | End: 2023-03-03 | Stop reason: HOSPADM

## 2023-03-02 RX ORDER — LORAZEPAM 1 MG/1
4 TABLET ORAL
Status: DISCONTINUED | OUTPATIENT
Start: 2023-03-02 | End: 2023-03-03 | Stop reason: HOSPADM

## 2023-03-02 RX ORDER — LORAZEPAM 1 MG/1
1 TABLET ORAL
Status: DISCONTINUED | OUTPATIENT
Start: 2023-03-02 | End: 2023-03-03 | Stop reason: HOSPADM

## 2023-03-02 RX ORDER — SODIUM CHLORIDE 9 MG/ML
INJECTION, SOLUTION INTRAVENOUS PRN
Status: DISCONTINUED | OUTPATIENT
Start: 2023-03-02 | End: 2023-03-03 | Stop reason: HOSPADM

## 2023-03-02 RX ORDER — LORAZEPAM 2 MG/ML
4 INJECTION INTRAMUSCULAR
Status: DISCONTINUED | OUTPATIENT
Start: 2023-03-02 | End: 2023-03-03 | Stop reason: HOSPADM

## 2023-03-02 RX ORDER — SODIUM CHLORIDE 0.9 % (FLUSH) 0.9 %
5-40 SYRINGE (ML) INJECTION EVERY 12 HOURS SCHEDULED
Status: DISCONTINUED | OUTPATIENT
Start: 2023-03-02 | End: 2023-03-03 | Stop reason: HOSPADM

## 2023-03-02 RX ORDER — SODIUM CHLORIDE 0.9 % (FLUSH) 0.9 %
5-40 SYRINGE (ML) INJECTION PRN
Status: DISCONTINUED | OUTPATIENT
Start: 2023-03-02 | End: 2023-03-03 | Stop reason: HOSPADM

## 2023-03-02 RX ORDER — LORAZEPAM 2 MG/ML
1 INJECTION INTRAMUSCULAR
Status: DISCONTINUED | OUTPATIENT
Start: 2023-03-02 | End: 2023-03-03 | Stop reason: HOSPADM

## 2023-03-02 RX ADMIN — VALSARTAN 160 MG: 80 TABLET ORAL at 09:19

## 2023-03-02 RX ADMIN — HEPARIN SODIUM 5000 UNITS: 5000 INJECTION INTRAVENOUS; SUBCUTANEOUS at 06:53

## 2023-03-02 RX ADMIN — HEPARIN SODIUM 5000 UNITS: 5000 INJECTION INTRAVENOUS; SUBCUTANEOUS at 13:58

## 2023-03-02 RX ADMIN — HYDROCHLOROTHIAZIDE 12.5 MG: 12.5 TABLET ORAL at 09:19

## 2023-03-02 RX ADMIN — MULTIVITAMIN TABLET 1 TABLET: TABLET at 22:39

## 2023-03-02 RX ADMIN — PANTOPRAZOLE SODIUM 40 MG: 40 TABLET, DELAYED RELEASE ORAL at 06:53

## 2023-03-02 RX ADMIN — FINASTERIDE 5 MG: 5 TABLET, FILM COATED ORAL at 09:19

## 2023-03-02 RX ADMIN — CEFTRIAXONE SODIUM 1000 MG: 1 INJECTION, POWDER, FOR SOLUTION INTRAMUSCULAR; INTRAVENOUS at 22:39

## 2023-03-02 RX ADMIN — ATORVASTATIN CALCIUM 20 MG: 20 TABLET, FILM COATED ORAL at 09:19

## 2023-03-02 RX ADMIN — HEPARIN SODIUM 5000 UNITS: 5000 INJECTION INTRAVENOUS; SUBCUTANEOUS at 22:39

## 2023-03-02 RX ADMIN — Medication 100 MG: at 22:39

## 2023-03-02 NOTE — PROGRESS NOTES
[facility] Lat   Urology Progress Note            Subjective:  Follow-up dysuria, UTI    Patient Vitals for the past 24 hrs:   BP Temp Temp src Pulse Resp SpO2   03/02/23 0641 (!) 141/76 99 °F (37.2 °C) -- 86 18 99 %   03/01/23 2330 -- 99.1 °F (37.3 °C) Oral -- -- --   03/01/23 1855 124/66 98.6 °F (37 °C) Oral 87 18 95 %   03/01/23 1502 117/63 (!) 101.3 °F (38.5 °C) Oral 88 18 95 %   03/01/23 1106 116/61 99.5 °F (37.5 °C) Oral 91 21 97 %       Intake/Output Summary (Last 24 hours) at 3/2/2023 9242  Last data filed at 3/1/2023 1604  Gross per 24 hour   Intake 1066.89 ml   Output --   Net 1066.89 ml       Recent Labs     02/28/23  2230 03/01/23  0831 03/02/23  0614   WBC 5.9 4.6 4.1   HGB 12.2* 12.0* 10.9*   HCT 36.9* 36.5* 33.5*   MCV 87.4 88.8 88.2    221 218     Recent Labs     02/28/23 2230 03/01/23  0831 03/02/23  0614   * 136 136   K 4.0 4.1 4.1   CL 93* 101 104   CO2 25 24 23   BUN 52* 34* 18   CREATININE 1.37* 1.09 0.91       Recent Labs     02/28/23  2220   COLORU Yellow   PHUR 6.0   WBCUA 20 TO 50   RBCUA 10 TO 20   BACTERIA MANY*   SPECGRAV 1.015   LEUKOCYTESUR MOD*   UROBILINOGEN Normal   BILIRUBINUR NEGATIVE       Additional Lab/culture results:    Physical Exam: Alert oriented not in acute distress patient feeling much better overall no burning with urination CT imaging reviewed    Interval Imaging Findings:    Impression:    Patient Active Problem List   Diagnosis    Essential hypertension    Erectile dysfunction due to arterial insufficiency    Family history of prostate cancer    BPH with obstruction/lower urinary tract symptoms    Adenomatous colon polyp    Dyslipidemia    Daily consumption of alcohol    Renal insufficiency    Tetanus, diphtheria, and acellular pertussis (Tdap) vaccination declined    Type 2 diabetes, diet controlled ([facility] [address]    Type 2 diabetes mellitus with stage 2 chronic kidney disease, without long-term current use of insulin (HCC) BPH with urinary obstruction    S/P cholecystectomy    Urinary tract infectious disease       Plan: Follow along with primary service    Keanu Yoon MD  7:07 AM 3/2/2023

## 2023-03-02 NOTE — PROGRESS NOTES
[facility]  Office: [address], DO, Soo Durbin, DO, Que Menon, DO, Zach Charles, DO, Kermit Abreu MD, Lorrie Elizabeth MD, Marcus Rosario MD, Hailey Kamara MD,  Marion Moran MD, Augusto Phoenix MD, Diomedes Hobson DO, Hanna Daniel MD,  Miguel Ángel Andrews MD, Brigido Woodruff MD, Anthony Nash DO, Jw Duavl MD, Conor Alfaro MD, Chidi Arita DO, Emily Yeh MD, Alicia Quan MD, Leon Manuel MD, Mitchell Garcia MD, Guanaco Brownlee DO, Lila Hernández MD, Melina Mondragon MD, Eboni Gill, CNP,  Darien Morfin CNP, Jorge Hudson, CNP, Husam Winslow, CNP,  Sarah Mays, [facility], Joshua Santiago, CNP, Carlo Dotson, CNP, Kathyrn Harada, CNP, Usman Rose, CNP, Alexis Browne, CNP, Geovanni Ya PA-C, Bettie Godinez, CNS, Marie Galo, [facility], Margie Salazar, [facility]    Progress Note    3/2/2023    2:51 PM    Name:   Tonio Madera  MRN:     [id number]     [place]:      [de-identified]   Room:   [address] [place] [address] Day:  1  Admit Date:  2/28/2023  9:48 PM    PCP:   Caridad Walden MD  Code Status:  Full Code    Subjective:     C/C:   Chief Complaint   Patient presents with    Hand Pain     Pt stated he fell a couple days ago. R hand pain. Swollen and unable to move hand    Groin Pain     Approx 2 weeks post op from prostate surgery     Interval History Status: . Complains of mild chills, had a Tmax yesterday 101.3  Final urine culture sensitivity report is pending  Getting Rocephin for UTI  Drinks alcohol and liver enzymes are elevated with normal bilirubin  Brief History:   68-year-old male with history of BPH status post surgery 2 weeks ago is presented with signs and symptoms and history suggestive of urinary tract infection. Subjective complaint of generalized weakness. Patient states that he fell a few days ago unable to get up.   He sustained injury to the left hand however radiologically there is no apparent fracture. He denies head and neck injury. He denies fever chills abdominal pain back pain   He had robotic prostatectomy 2 weeks ago which was uneventful  In the ER he was found to have pyuria and microhematuria in the urine sample, urine cultures growing Klebsiella and Enterococcus faecalis,     Review of Systems:     Constitutional:  + for chills, denies fevers, sweats  Respiratory:  negative for cough, dyspnea on exertion, shortness of breath, wheezing  Cardiovascular:  negative for chest pain, chest pressure/discomfort, lower extremity edema, palpitations  Gastrointestinal:  negative for abdominal pain, constipation, diarrhea, nausea, vomiting  Neurological:  negative for dizziness, headache    Medications: Allergies:  No Known Allergies    Current Meds:   Scheduled Meds:    sodium chloride flush  5-40 mL IntraVENous 2 times per day    atorvastatin  20 mg Oral Daily    finasteride  5 mg Oral Daily    cefTRIAXone (ROCEPHIN) IV  1,000 mg IntraVENous Q24H    heparin (porcine)  5,000 Units SubCUTAneous 3 times per day    pantoprazole  40 mg Oral QAM AC    valsartan  160 mg Oral Daily    hydroCHLOROthiazide  12.5 mg Oral Daily     Continuous Infusions:    sodium chloride      sodium chloride 75 mL/hr at 03/02/23 0732     PRN Meds: sodium chloride flush, sodium chloride, acetaminophen    Data:     Past Medical History:   has a past medical history of Adenomatous colon polyp, Caffeine use, History of colon polyps, Hyperlipidemia, Hypertension, and Pre-diabetes. Social History:   reports that he has never smoked. He has been exposed to tobacco smoke. He has never used smokeless tobacco. He reports that he does not currently use alcohol. He reports that he does not use drugs.      Family History:   Family History   Problem Relation Age of Onset    Heart Attack Mother     Prostate Cancer Father        Vitals:  /68   Pulse 73   Temp 98.4 °F (36.9 °C) (Oral)   Resp 18 Ht 5' 8\" (1.727 m)   Wt 144 lb 12.8 oz (65.7 kg)   SpO2 97%   BMI 22.02 kg/m²   Temp (24hrs), Av.3 °F (37.4 °C), Min:98.4 °F (36.9 °C), Max:101.3 °F (38.5 °C)    No results for input(s): POCGLU in the last 72 hours. I/O (24Hr): Intake/Output Summary (Last 24 hours) at 3/2/2023 1451  Last data filed at 3/2/2023 1143  Gross per 24 hour   Intake 2237.57 ml   Output 350 ml   Net 1887.57 ml       Labs:  Hematology:  Recent Labs     23  0614   WBC 5.9 4.6 4.1   RBC 4.22 4.11* 3.80*   HGB 12.2* 12.0* 10.9*   HCT 36.9* 36.5* 33.5*   MCV 87.4 88.8 88.2   MCH 28.9 29.2 28.7   MCHC 33.1 32.9 32.5   RDW 14.1 14.1 14.3    221 218   MPV 9.6 9.4 9.1     Chemistry:  Recent Labs     23  0614   * 136 136   K 4.0 4.1 4.1   CL 93* 101 104   CO2 25 24 23   GLUCOSE 135* 111* 114*   BUN 52* 34* 18   CREATININE 1.37* 1.09 0.91   ANIONGAP 13 11 9   LABGLOM 54* >60 >60   CALCIUM 9.8 8.9 8.4*     Recent Labs     2331 23  0614   PROT 7.3 6.4   LABALBU 2.8* 2.6*   AST 56* 105*   ALT 96* 122*   ALKPHOS 173* 205*   BILITOT 0.6 0.4     ABG:No results found for: POCPH, PHART, PH, POCPCO2, TXM4NJQ, PCO2, POCPO2, PO2ART, PO2, POCHCO3, YOJ5AFI, HCO3, NBEA, PBEA, BEART, BE, THGBART, THB, XBH8CKF, KEMD1UHM, M8LTEOXB, O2SAT, FIO2  Lab Results   Component Value Date/Time    SPECIAL RT FOREARM 6ML 2023 10:30 PM     Lab Results   Component Value Date/Time    CULTURE NO GROWTH 1 DAY 2023 10:30 PM       Radiology:  CT ABDOMEN PELVIS WO CONTRAST Additional Contrast? None    Result Date: 3/2/2023  1. Gas within the urinary bladder which may be from recent bladder catheterization. Correlate clinically. 2. Minimal gas bubbles anterior to the urinary bladder and deep to the abdominal wall musculature. This may be from recent surgery. Correlate clinically.      XR WRIST RIGHT (MIN 3 VIEWS)    Result Date: 2023  No acute abnormality seen.     XR HAND RIGHT (MIN 3 VIEWS)    Result Date: 2/28/2023  No abnormality seen. CT HEAD WO CONTRAST    Result Date: 3/1/2023  No evidence of intracranial hemorrhage or any other definable acute intracranial abnormality. There is no definable focal abnormality or acute process in brain. XR CHEST PORTABLE    Result Date: 2/28/2023  Mild hyperinflation with no acute pulmonary abnormality. XR HIP 2-3 VW W PELVIS RIGHT    Result Date: 2/28/2023  Mild osteoarthritic changes in both hips with no acute bony abnormality.        Physical Examination:        General appearance:  alert, cooperative and no distress  Mental Status:  oriented to person, place and time and normal affect  Lungs:  clear to auscultation bilaterally, normal effort  Heart:  regular rate and rhythm, no murmur  Abdomen:  soft, nontender, nondistended, normal bowel sounds, no masses, hepatomegaly, splenomegaly  Extremities:  + Ace dressing right wrist, no lower extremity edema, redness, tenderness in the calves  Skin:  no gross lesions, rashes, induration    Assessment:        Hospital Problems             Last Modified POA    * (Principal) Urinary tract infectious disease 3/1/2023 Yes    Elevated liver enzymes 3/2/2023 Yes    Essential hypertension 3/2/2023 Yes    Daily consumption of alcohol 3/2/2023 Yes       Plan:        Continue IV Rocephin pending final urine culture results  Continue Diovan and hydrochlorothiazide with history of hypertension  Start on CIWA protocol with history of alcoholism  DVT prophylaxis with heparin  GI prophylaxis  Continue gentle IV hydration, creatinine is nicely trending down  Urology evaluation in progress      Thomas Valdivia MD  3/2/2023  2:51 PM

## 2023-03-02 NOTE — PROGRESS NOTES
Physician Progress Note      PATIENT:               Cliffton Sacks  CSN #:                  [id number]  :                       1950  ADMIT DATE:       2023 9:48 PM  [address] [place] DATE:  RESPONDING  PROVIDER #:        Lisa Bailon MD          QUERY TEXT:    Pt admitted with UTI. Pt noted to have had prostatectomy 2 weeks prior to   arrival. If possible, please document in the progress notes and discharge   summary if you are evaluating and/or treating any of the following: The medical record reflects the following:  Risk Factors: recent prostatectomy  Clinical Indicators: presents with acute cystitis, post-op from prostatectomy   2 weeks prior  Treatment: Labs, Rocephin, monitoring  Options provided:  -- UTI due to previous urinary catheter from prostate surgery  -- UTI as a complication of prostatectomy  -- Other - I will add my own diagnosis  -- Disagree - Not applicable / Not valid  -- Disagree - Clinically unable to determine / Unknown  -- Refer to Clinical Documentation Reviewer    PROVIDER RESPONSE TEXT:    UTI is a complication of prostatectomy.     Query created by: Bebeto Diaz on 3/2/2023 8:18 AM      Electronically signed by:  Lisa Bailon MD 3/2/2023 8:19 AM

## 2023-03-03 VITALS
BODY MASS INDEX: 21.95 KG/M2 | RESPIRATION RATE: 18 BRPM | WEIGHT: 144.8 LBS | TEMPERATURE: 97.3 F | DIASTOLIC BLOOD PRESSURE: 81 MMHG | HEART RATE: 83 BPM | OXYGEN SATURATION: 100 % | SYSTOLIC BLOOD PRESSURE: 149 MMHG | HEIGHT: 68 IN

## 2023-03-03 LAB
ALBUMIN SERPL-MCNC: 2.4 G/DL (ref 3.5–5.2)
ALP SERPL-CCNC: 235 U/L (ref 40–129)
ALT SERPL-CCNC: 197 U/L (ref 5–41)
ANION GAP SERPL CALCULATED.3IONS-SCNC: 11 MMOL/L (ref 9–17)
AST SERPL-CCNC: 179 U/L
BILIRUB SERPL-MCNC: 0.3 MG/DL (ref 0.3–1.2)
BUN SERPL-MCNC: 14 MG/DL (ref 8–23)
BUN/CREAT BLD: 17 (ref 9–20)
CALCIUM SERPL-MCNC: 8.1 MG/DL (ref 8.6–10.4)
CHLORIDE SERPL-SCNC: 104 MMOL/L (ref 98–107)
CO2 SERPL-SCNC: 22 MMOL/L (ref 20–31)
CREAT SERPL-MCNC: 0.84 MG/DL (ref 0.7–1.2)
GFR SERPL CREATININE-BSD FRML MDRD: >60 ML/MIN/1.73M2
GLUCOSE SERPL-MCNC: 112 MG/DL (ref 70–99)
HCT VFR BLD AUTO: 34.1 % (ref 40.7–50.3)
HGB BLD-MCNC: 11.1 G/DL (ref 13–17)
MCH RBC QN AUTO: 28.5 PG (ref 25.2–33.5)
MCHC RBC AUTO-ENTMCNC: 32.6 G/DL (ref 28.4–34.8)
MCV RBC AUTO: 87.7 FL (ref 82.6–102.9)
NRBC AUTOMATED: 0 PER 100 WBC
PDW BLD-RTO: 14.2 % (ref 11.8–14.4)
PLATELET # BLD AUTO: 224 K/UL (ref 138–453)
PMV BLD AUTO: 9.8 FL (ref 8.1–13.5)
POTASSIUM SERPL-SCNC: 4.1 MMOL/L (ref 3.7–5.3)
PROT SERPL-MCNC: 6.5 G/DL (ref 6.4–8.3)
RBC # BLD: 3.89 M/UL (ref 4.21–5.77)
SODIUM SERPL-SCNC: 137 MMOL/L (ref 135–144)
WBC # BLD AUTO: 3.1 K/UL (ref 3.5–11.3)

## 2023-03-03 PROCEDURE — 85027 COMPLETE CBC AUTOMATED: CPT

## 2023-03-03 PROCEDURE — 80053 COMPREHEN METABOLIC PANEL: CPT

## 2023-03-03 PROCEDURE — 36415 COLL VENOUS BLD VENIPUNCTURE: CPT

## 2023-03-03 PROCEDURE — 99239 HOSP IP/OBS DSCHRG MGMT >30: CPT | Performed by: NURSE PRACTITIONER

## 2023-03-03 PROCEDURE — 6370000000 HC RX 637 (ALT 250 FOR IP): Performed by: FAMILY MEDICINE

## 2023-03-03 PROCEDURE — 6360000002 HC RX W HCPCS: Performed by: FAMILY MEDICINE

## 2023-03-03 PROCEDURE — 2580000003 HC RX 258: Performed by: FAMILY MEDICINE

## 2023-03-03 RX ORDER — LEVOFLOXACIN 500 MG/1
500 TABLET, FILM COATED ORAL DAILY
Qty: 10 TABLET | Refills: 0 | Status: SHIPPED | OUTPATIENT
Start: 2023-03-03 | End: 2023-03-13

## 2023-03-03 RX ORDER — LEVOFLOXACIN 500 MG/1
500 TABLET, FILM COATED ORAL DAILY
Qty: 10 TABLET | Refills: 0 | Status: SHIPPED | OUTPATIENT
Start: 2023-03-03 | End: 2023-03-03 | Stop reason: SDUPTHER

## 2023-03-03 RX ORDER — PANTOPRAZOLE SODIUM 40 MG/1
40 TABLET, DELAYED RELEASE ORAL
Qty: 30 TABLET | Refills: 3 | Status: SHIPPED | OUTPATIENT
Start: 2023-03-04

## 2023-03-03 RX ORDER — THIAMINE MONONITRATE (VIT B1) 100 MG
100 TABLET ORAL DAILY
Qty: 90 TABLET | Refills: 0 | Status: SHIPPED | OUTPATIENT
Start: 2023-03-03

## 2023-03-03 RX ORDER — PANTOPRAZOLE SODIUM 40 MG/1
40 TABLET, DELAYED RELEASE ORAL
Qty: 30 TABLET | Refills: 3 | Status: SHIPPED | OUTPATIENT
Start: 2023-03-04 | End: 2023-03-03 | Stop reason: SDUPTHER

## 2023-03-03 RX ORDER — MULTIVITAMIN WITH IRON
1 TABLET ORAL DAILY
Qty: 90 TABLET | Refills: 0 | Status: SHIPPED | OUTPATIENT
Start: 2023-03-03

## 2023-03-03 RX ADMIN — HEPARIN SODIUM 5000 UNITS: 5000 INJECTION INTRAVENOUS; SUBCUTANEOUS at 06:24

## 2023-03-03 RX ADMIN — HYDROCHLOROTHIAZIDE 12.5 MG: 12.5 TABLET ORAL at 08:53

## 2023-03-03 RX ADMIN — FINASTERIDE 5 MG: 5 TABLET, FILM COATED ORAL at 08:53

## 2023-03-03 RX ADMIN — PANTOPRAZOLE SODIUM 40 MG: 40 TABLET, DELAYED RELEASE ORAL at 06:24

## 2023-03-03 RX ADMIN — VALSARTAN 160 MG: 80 TABLET ORAL at 08:53

## 2023-03-03 RX ADMIN — ATORVASTATIN CALCIUM 20 MG: 20 TABLET, FILM COATED ORAL at 08:53

## 2023-03-03 RX ADMIN — SODIUM CHLORIDE: 9 INJECTION, SOLUTION INTRAVENOUS at 04:17

## 2023-03-03 ASSESSMENT — PAIN SCALES - GENERAL: PAINLEVEL_OUTOF10: 0

## 2023-03-03 NOTE — PROGRESS NOTES
CLINICAL PHARMACY NOTE: MEDS TO BEDS    Total # of Prescriptions Filled: 0   The following medications were delivered to the patient:        Additional Documentation:     Pt declined b-1 100mg  Pantoprazole 40mg  Levofloxacin 500mg  Multivitamin tabs    They were sent over to his anna

## 2023-03-03 NOTE — PROGRESS NOTES
[facility]  Office: [address], DO, Stan Andrea, DO, Orianaron Pimentel, DO, Hankne Clem Araceli, DO, Suleiman Atkins MD, Lady Jesus MD, Lisandro Basurto MD, Jonatan Prado MD,  Mirian Bernstein MD, Sergei Santizo MD, Beth Parker, DO, Rashawn Marquez MD,  Tylor Tirado MD, Dahiana Weeks MD, Benito Crowder, DO, Chip Mata MD, Hamida Diaz MD, Carmen Gamez, DO, Sylvester Phelps MD, León Baird MD, Kelly Hannah MD, Kanwal Dia MD, Jocelyn Teran DO, Ketty Carolina MD, Kaitlyn Wong MD, Rohini Rosario, CNP,  Christine Nam, CNP, Mi Marcelino, CNP, Vincent Espinal, CNP,  Britt Lama, [facility], Marbella Coburn, CNP, Cata Lovelace, CNP, Primo Coles, CNP, Yaritza Farris, CNP, Tiffani Dawson, CNP, Quyen López PA-C, Emmy Leahy, CNS, Vu Cote, CNP, Pineda Han [facility]    Progress Note    3/3/2023    11:23 AM    Name:   Beni Lynne  MRN:     [id number]     [place]:      [de-identified]   Room:   [address] [place] [address] Day:  2  Admit Date:  2/28/2023  9:48 PM    PCP:   Jayleen Alonzo MD  Code Status:  Full Code    Subjective:     C/C:   Chief Complaint   Patient presents with    Hand Pain     Pt stated he fell a couple days ago. R hand pain. Swollen and unable to move hand    Groin Pain     Approx 2 weeks post op from prostate surgery     Interval History Status: improved. Patient condition continues to improve. Urine culture has returned positive for Klebsiella pneumonia and Enterococcus. Sensitivity reports reveals that both are sensitive to Levaquin. Patient may transition to oral regiment. He is adamant that he is leaving the hospital today with or without a discharge. Options for care discussed at length with the patient as he elected to pursue an outpatient treatment regiment. He will be started on an oral regimen and instructed follow-up with his urologist in short order. Patient is agreeable. Patient also is educated on the need for alcohol abstinence. Patient is resistant. Brief History:     77-year-old male with history of BPH status post surgery 2 weeks ago is presented with signs and symptoms and history suggestive of urinary tract infection. Subjective complaint of generalized weakness. Patient states that he fell a few days ago unable to get up. He sustained injury to the left hand however radiologically there is no apparent fracture. He denies head and neck injury. He denies fever chills abdominal pain back pain   He had robotic prostatectomy 2 weeks ago which was uneventful  In the ER he was found to have pyuria and microhematuria in the urine sample, urine cultures growing Klebsiella and Enterococcus faecalis,     3/3 - Patient condition continues to improve. Urine culture has returned positive for Klebsiella pneumonia and Enterococcus. Sensitivity reports reveals that both are sensitive to Levaquin. Patient may transition to oral regiment. He is adamant that he is leaving the hospital today with or without a discharge. Options for care discussed at length with the patient as he elected to pursue an outpatient treatment regiment. He will be started on an oral regimen and instructed follow-up with his urologist in short order. Patient is agreeable. Patient also is educated on the need for alcohol abstinence. Patient is resistant. Review of Systems:     Constitutional:  negative for chills, fevers, sweats  Respiratory:  negative for cough, dyspnea on exertion, shortness of breath, wheezing  Cardiovascular:  negative for chest pain, chest pressure/discomfort, lower extremity edema, palpitations  Gastrointestinal:  negative for abdominal pain, constipation, diarrhea, nausea, vomiting  Neurological:  negative for dizziness, headache    Medications:      Allergies:  No Known Allergies    Current Meds:   Scheduled Meds:    sodium chloride flush  5-40 mL IntraVENous 2 times per day    thiamine  100 mg Oral Daily    multivitamin  1 tablet Oral Daily    atorvastatin  20 mg Oral Daily    finasteride  5 mg Oral Daily    cefTRIAXone (ROCEPHIN) IV  1,000 mg IntraVENous Q24H    heparin (porcine)  5,000 Units SubCUTAneous 3 times per day    pantoprazole  40 mg Oral QAM AC    valsartan  160 mg Oral Daily    hydroCHLOROthiazide  12.5 mg Oral Daily     Continuous Infusions:    sodium chloride      sodium chloride 75 mL/hr at 23 0638     PRN Meds: sodium chloride flush, sodium chloride, LORazepam **OR** LORazepam **OR** LORazepam **OR** LORazepam **OR** LORazepam **OR** LORazepam **OR** LORazepam **OR** LORazepam, acetaminophen    Data:     Past Medical History:   has a past medical history of Adenomatous colon polyp, Caffeine use, History of colon polyps, Hyperlipidemia, Hypertension, and Pre-diabetes. Social History:   reports that he has never smoked. He has been exposed to tobacco smoke. He has never used smokeless tobacco. He reports that he does not currently use alcohol. He reports that he does not use drugs. Family History:   Family History   Problem Relation Age of Onset    Heart Attack Mother     Prostate Cancer Father        Vitals:  BP (!) 149/81   Pulse 83   Temp 97.3 °F (36.3 °C) (Oral)   Resp 18   Ht 5' 8\" (1.727 m)   Wt 144 lb 12.8 oz (65.7 kg)   SpO2 100%   BMI 22.02 kg/m²   Temp (24hrs), Av.3 °F (36.8 °C), Min:97.3 °F (36.3 °C), Max:99.5 °F (37.5 °C)    No results for input(s): POCGLU in the last 72 hours. I/O (24Hr):     Intake/Output Summary (Last 24 hours) at 3/3/2023 1123  Last data filed at 3/3/2023 2988  Gross per 24 hour   Intake 1253.77 ml   Output 350 ml   Net 903.77 ml       Labs:  Hematology:  Recent Labs     23  0831 23  0614 23  0633   WBC 4.6 4.1 3.1*   RBC 4.11* 3.80* 3.89*   HGB 12.0* 10.9* 11.1*   HCT 36.5* 33.5* 34.1*   MCV 88.8 88.2 87.7   MCH 29.2 28.7 28.5   MCHC 32.9 32.5 32.6   RDW 14.1 14.3 14.2    218 224   MPV 9.4 9.1 9.8     Chemistry:  Recent Labs     03/01/23  0831 03/02/23  0614 03/03/23  0633    136 137   K 4.1 4.1 4.1    104 104   CO2 24 23 22   GLUCOSE 111* 114* 112*   BUN 34* 18 14   CREATININE 1.09 0.91 0.84   ANIONGAP 11 9 11   LABGLOM >60 >60 >60   CALCIUM 8.9 8.4* 8.1*     Recent Labs     03/01/23  0831 03/02/23  0614 03/03/23  0633   PROT 7.3 6.4 6.5   LABALBU 2.8* 2.6* 2.4*   AST 56* 105* 179*   ALT 96* 122* 197*   ALKPHOS 173* 205* 235*   BILITOT 0.6 0.4 0.3     ABG:No results found for: POCPH, PHART, PH, POCPCO2, IZP1LDP, PCO2, POCPO2, PO2ART, PO2, POCHCO3, XNO7SXZ, HCO3, NBEA, PBEA, BEART, BE, THGBART, THB, RCC1KPD, SQOY0GSS, I7KZMMOA, O2SAT, FIO2  Lab Results   Component Value Date/Time    SPECIAL RT FOREARM 6ML 02/28/2023 10:30 PM     Lab Results   Component Value Date/Time    CULTURE NO GROWTH 2 DAYS 02/28/2023 10:30 PM       Radiology:  CT ABDOMEN PELVIS WO CONTRAST Additional Contrast? None    Result Date: 3/2/2023  1. Gas within the urinary bladder which may be from recent bladder catheterization. Correlate clinically. 2. Minimal gas bubbles anterior to the urinary bladder and deep to the abdominal wall musculature. This may be from recent surgery. Correlate clinically. XR WRIST RIGHT (MIN 3 VIEWS)    Result Date: 2/28/2023  No acute abnormality seen. XR HAND RIGHT (MIN 3 VIEWS)    Result Date: 2/28/2023  No abnormality seen. CT HEAD WO CONTRAST    Result Date: 3/1/2023  No evidence of intracranial hemorrhage or any other definable acute intracranial abnormality. There is no definable focal abnormality or acute process in brain. XR CHEST PORTABLE    Result Date: 2/28/2023  Mild hyperinflation with no acute pulmonary abnormality. XR HIP 2-3 VW W PELVIS RIGHT    Result Date: 2/28/2023  Mild osteoarthritic changes in both hips with no acute bony abnormality.        Physical Examination:        General appearance:  alert, cooperative and no distress  Mental Status:  oriented to person, place and time and normal affect  Lungs:  clear to auscultation bilaterally, normal effort  Heart:  regular rate and rhythm, no murmur  Abdomen:  soft, nontender, nondistended, normal bowel sounds, no masses, hepatomegaly, splenomegaly  Extremities:  no edema, redness, tenderness in the calves  Skin:  no gross lesions, rashes, induration    Assessment:        Hospital Problems             Last Modified POA    * (Principal) Urinary tract infectious disease 3/1/2023 Yes    Elevated liver enzymes 3/2/2023 Yes    Essential hypertension 3/2/2023 Yes    Daily consumption of alcohol 3/2/2023 Yes       Plan:        UTI, status post TURP 2 weeks ago  Culture KLEBSIELLA PNEUMONIAE >[id number] CFU/ML Abnormal     Culture ENTEROCOCCUS FAECALIS >191902 CFU/ML Abnormal     Sensitivity reports both the above are susceptible to Levaquin. Initiate p.o. regiment and discharged today.   Essential hypertension  Continue home medication regiment without alteration  Alcohol dependence  Education on the need for abstinence, patient is resistant    DIVINE More NP  3/3/2023  11:23 AM

## 2023-03-03 NOTE — PROGRESS NOTES
[facility]    Urology Progress Note            Subjective:  Follow-up for UTI prostate hypertrophy status post robotic simple prostatectomy    Patient Vitals for the past 24 hrs:   BP Temp Temp src Pulse Resp SpO2   03/03/23 1007 (!) 149/81 97.3 °F (36.3 °C) Oral 83 18 100 %   03/03/23 0639 (!) 167/81 97.9 °F (36.6 °C) Oral 73 16 99 %   03/02/23 1859 139/76 98.6 °F (37 °C) Oral 82 18 98 %   03/02/23 1524 126/62 99.5 °F (37.5 °C) Oral 78 18 99 %   03/02/23 1143 132/68 98.4 °F (36.9 °C) Oral 73 18 97 %       Intake/Output Summary (Last 24 hours) at 3/3/2023 1106  Last data filed at 3/3/2023 3082  Gross per 24 hour   Intake 1253.77 ml   Output 350 ml   Net 903.77 ml       Recent Labs     03/01/23  0831 03/02/23  0614 03/03/23  0633   WBC 4.6 4.1 3.1*   HGB 12.0* 10.9* 11.1*   HCT 36.5* 33.5* 34.1*   MCV 88.8 88.2 87.7    218 224     Recent Labs     03/01/23  0831 03/02/23  0614 03/03/23  0633    136 137   K 4.1 4.1 4.1    104 104   CO2 24 23 22   BUN 34* 18 14   CREATININE 1.09 0.91 0.84       Recent Labs     02/28/23  2220   COLORU Yellow   PHUR 6.0   WBCUA 20 TO 50   RBCUA 10 TO 20   BACTERIA MANY*   SPECGRAV 1.015   LEUKOCYTESUR MOD*   UROBILINOGEN Normal   BILIRUBINUR NEGATIVE       Additional Lab/culture results:    Physical Exam: Patient alert oriented not in acute distress voiding well no dysuria no hematuria    Interval Imaging Findings:    Impression:    Patient Active Problem List   Diagnosis    Essential hypertension    Erectile dysfunction due to arterial insufficiency    Family history of prostate cancer    BPH with obstruction/lower urinary tract symptoms    Adenomatous colon polyp    Dyslipidemia    Daily consumption of alcohol    Renal insufficiency    Tetanus, diphtheria, and acellular pertussis (Tdap) vaccination declined    Type 2 diabetes, diet controlled ([address]    Type 2 diabetes mellitus with stage 2 chronic kidney disease, without long-term current use of insulin ([facility] [address]    BPH with urinary obstruction    S/P cholecystectomy    Urinary tract infectious disease    Elevated liver enzymes       Plan: Agree with discharge planning, patient will see Dr. Lynsey Carrera for follow-up next week    Sakshi Jones MD  11:06 AM 3/3/2023

## 2023-03-03 NOTE — DISCHARGE SUMMARY
[facility]  Office: [address], DO, Umair Narrow, DO, Ludamarita Chris, DO, Ziggy Ruth Blood, DO, Rosa Darling MD, Keon Luther MD, Nemesio Garcia MD, Ruben Martinez MD,  Lisandro Mancini MD, Gaby Pacheco MD, Justin Quintanilla, DO, Salvador De Leon MD,  Reyes Chauhan MD, Lloyd Mary MD, Mignon Leahy, DO, Brook Bright MD, Emily Minor MD, Ida Porter, DO, Ciro Gomes MD, Meryle Cinnamon, MD, Pia Brito MD, Mando Knight MD, Figueroa Camacho, DO, Jose Maria Wright MD, Jaime James MD, Елена Cheung, CNP,  Rajeev Love, CNP, Dimitris Bolanos, CNP, Ziyad Magana, CNP,  Alma Rosa Martin, DNP, Candy Barron, CNP, Sujit Cohn, CNP, Yumiko Lopez, CNP, Og Mcdermott, CNP, Jj Brasher, CNP, Carmen Ornelas PA-C, Jaime Urban, CNS, Maritza Okeefe, CNP, Gosia Mendoza, CNP         [facility]    Discharge Summary     Patient ID: Mildred Kwon  :  1950   MRN: [id number]     ACCOUNT:  [de-identified]   Patient's PCP: Geovanna Ling MD  Admit Date: 2023   Discharge Date: 3/3/2023     Length of Stay: 2  Code Status:  Full Code  Admitting Physician: Geovanna Ling MD  Discharge Physician: DIVINE Rodriguez NP     Active Discharge Diagnoses:     Hospital Problem Lists:  Principal Problem:    Urinary tract infectious disease  Active Problems:    Elevated liver enzymes    Essential hypertension    Daily consumption of alcohol  Resolved Problems:    * No resolved hospital problems. *      Admission Condition:  fair     Discharged Condition: good    Hospital Stay:     Hospital Course:  Mildred Kwon is a 74 y.o. male who was admitted for the management of  Urinary tract infectious disease , presented to ER with Hand Pain (Pt stated he fell a couple days ago. R hand pain.  Swollen and unable to move hand) and Groin Pain (Approx 2 weeks post op from prostate surgery)      57-year-old male with history of BPH status post surgery 2 weeks ago is presented with signs and symptoms and history suggestive of urinary tract infection. Subjective complaint of generalized weakness. Patient states that he fell a few days ago unable to get up. He sustained injury to the left hand however radiologically there is no apparent fracture. He denies head and neck injury. He denies fever chills abdominal pain back pain   He had robotic prostatectomy 2 weeks ago which was uneventful  In the ER he was found to have pyuria and microhematuria in the urine sample, urine cultures growing Klebsiella and Enterococcus faecalis,     3/3 - Patient condition continues to improve. Urine culture has returned positive for Klebsiella pneumonia and Enterococcus. Sensitivity reports reveals that both are sensitive to Levaquin. Patient may transition to oral regiment. He is adamant that he is leaving the hospital today with or without a discharge. Options for care discussed at length with the patient as he elected to pursue an outpatient treatment regiment. He will be started on an oral regimen and instructed follow-up with his urologist in short order. Patient is agreeable. Patient also is educated on the need for alcohol abstinence. Patient is resistant.     Significant therapeutic interventions: As above    Significant Diagnostic Studies:   Labs / Micro:  CBC:   Lab Results   Component Value Date/Time    WBC 3.1 03/03/2023 06:33 AM    RBC 3.89 03/03/2023 06:33 AM    HGB 11.1 03/03/2023 06:33 AM    HCT 34.1 03/03/2023 06:33 AM    MCV 87.7 03/03/2023 06:33 AM    MCH 28.5 03/03/2023 06:33 AM    MCHC 32.6 03/03/2023 06:33 AM    RDW 14.2 03/03/2023 06:33 AM     03/03/2023 06:33 AM     BMP:    Lab Results   Component Value Date/Time    GLUCOSE 112 03/03/2023 06:33 AM     03/03/2023 06:33 AM    K 4.1 03/03/2023 06:33 AM     03/03/2023 06:33 AM    CO2 22 03/03/2023 06:33 AM    ANIONGAP 11 03/03/2023 06:33 AM    BUN 14 03/03/2023 06:33 AM    CREATININE 0.84 03/03/2023 06:33 AM    BUNCRER 17 03/03/2023 06:33 AM    CALCIUM 8.1 03/03/2023 06:33 AM    LABGLOM >60 03/03/2023 06:33 AM    GFRAA >60 05/13/2022 08:32 AM    GFR      05/13/2022 08:32 AM        Radiology:  CT ABDOMEN PELVIS WO CONTRAST Additional Contrast? None    Result Date: 3/2/2023  1. Gas within the urinary bladder which may be from recent bladder catheterization. Correlate clinically. 2. Minimal gas bubbles anterior to the urinary bladder and deep to the abdominal wall musculature. This may be from recent surgery. Correlate clinically. XR WRIST RIGHT (MIN 3 VIEWS)    Result Date: 2/28/2023  No acute abnormality seen. XR HAND RIGHT (MIN 3 VIEWS)    Result Date: 2/28/2023  No abnormality seen. CT HEAD WO CONTRAST    Result Date: 3/1/2023  No evidence of intracranial hemorrhage or any other definable acute intracranial abnormality. There is no definable focal abnormality or acute process in brain. XR CHEST PORTABLE    Result Date: 2/28/2023  Mild hyperinflation with no acute pulmonary abnormality. XR HIP 2-3 VW W PELVIS RIGHT    Result Date: 2/28/2023  Mild osteoarthritic changes in both hips with no acute bony abnormality. Consultations:    Consults:     Final Specialist Recommendations/Findings:   IP CONSULT TO HOSPITALIST  IP CONSULT TO UROLOGY  IP CONSULT TO SOCIAL WORK      The patient was seen and examined on day of discharge and this discharge summary is in conjunction with any daily progress note from day of discharge. Discharge plan:     Disposition: Home    Physician Follow Up:     Please follow-up with your urologist in 7 to 10 days. Requiring Further Evaluation/Follow Up POST HOSPITALIZATION/Incidental Findings: Alcohol dependence    Diet: regular diet and encourage fluids    Activity: As tolerated    Instructions to Patient: Take the antibiotics exactly as prescribed. Follow-up with your urologist in 7 to 10 days.   Make and keep a follow-up appointment with your primary care provider for 1 month. Please reduce the amount of alcohol you consume. Discharge Medications:      Medication List        START taking these medications      levoFLOXacin 500 MG tablet  Commonly known as: LEVAQUIN  Take 1 tablet by mouth daily for 10 days     multivitamin Tabs tablet  Take 1 tablet by mouth daily     pantoprazole 40 MG tablet  Commonly known as: PROTONIX  Take 1 tablet by mouth every morning (before breakfast)  Start taking on: March 4, 2023     vitamin B-1 100 MG tablet  Commonly known as: THIAMINE  Take 1 tablet by mouth daily            CONTINUE taking these medications      atorvastatin 20 MG tablet  Commonly known as: LIPITOR  TAKE 1 TABLET EVERY DAY     finasteride 5 MG tablet  Commonly known as: PROSCAR  Take 1 tablet by mouth daily     metFORMIN 500 MG tablet  Commonly known as: GLUCOPHAGE  Take 1 tablet by mouth 2 times daily (with meals)     trospium 20 MG tablet  Commonly known as: SANCTURA  Take 1 tablet by mouth 2 times daily (before meals)     valsartan-hydroCHLOROthiazide 160-12.5 MG per tablet  Commonly known as: DIOVAN-HCT  TAKE 1 TABLET EVERY DAY               Where to Get Your Medications        These medications were sent to [facility] [place] [address]      Phone: [phone]   multivitamin Tabs tablet  vitamin B-1 100 MG tablet       These medications were sent to [facility] [id number] [place] [address], [address] [facility] [address]      Phone: [phone]   levoFLOXacin 500 MG tablet  pantoprazole 40 MG tablet         No discharge procedures on file.     Time Spent on discharge is  38 mins in patient examination, evaluation, counseling as well as medication reconciliation, prescriptions for required medications, discharge plan and follow up. Electronically signed by   DIVINE Lujan NP  3/3/2023  12:19 PM      Thank you Dr. Tima Anton MD for the opportunity to be involved in this patient's care.

## 2023-03-03 NOTE — PROGRESS NOTES
Pt discharged to home in stable condition with belongings. Discharge instructions given. Pt denies having any further questions at this time. Locked up home medication(s)/personal items given to patient at discharge. Patient/family state they have everything they were admitted with. Pt requests to walk to exit by himself accompanied by daughter.

## 2023-03-03 NOTE — PLAN OF CARE

## 2023-03-05 LAB
MICROORGANISM SPEC CULT: NORMAL
MICROORGANISM SPEC CULT: NORMAL
SERVICE CMNT-IMP: NORMAL
SERVICE CMNT-IMP: NORMAL
SPECIMEN DESCRIPTION: NORMAL
SPECIMEN DESCRIPTION: NORMAL

## 2023-03-07 DIAGNOSIS — I10 ESSENTIAL HYPERTENSION: ICD-10-CM

## 2023-03-08 RX ORDER — VALSARTAN AND HYDROCHLOROTHIAZIDE 160; 12.5 MG/1; MG/1
TABLET, FILM COATED ORAL
Qty: 90 TABLET | Refills: 0 | Status: SHIPPED | OUTPATIENT
Start: 2023-03-08

## 2023-03-08 NOTE — TELEPHONE ENCOUNTER
Mary Zavala is calling to request a refill on the following medication(s):    Medication Request:  Requested Prescriptions     Pending Prescriptions Disp Refills    valsartan-hydroCHLOROthiazide (DIOVAN-HCT) 160-12.5 MG per tablet [Pharmacy Med Name: VALSARTAN/HYDROCHLOROTHIAZIDE 160-12.5 MG Tablet] 90 tablet 0     Sig: TAKE 1 TABLET EVERY DAY       Last Visit Date (If Applicable):  6/98/2498    Next Visit Date:    5/30/2023

## 2023-03-16 ENCOUNTER — HOSPITAL ENCOUNTER (OUTPATIENT)
Age: 73
Setting detail: SPECIMEN
Discharge: HOME | End: 2023-03-16

## 2023-03-16 DIAGNOSIS — B95.2 UTI (URINARY TRACT INFECTION) DUE TO ENTEROCOCCUS: ICD-10-CM

## 2023-03-16 DIAGNOSIS — N39.0 UTI (URINARY TRACT INFECTION) DUE TO ENTEROCOCCUS: ICD-10-CM

## 2023-03-19 LAB
MICROORGANISM SPEC CULT: ABNORMAL
SPECIMEN DESCRIPTION: ABNORMAL

## 2023-04-05 RX ORDER — ATORVASTATIN CALCIUM 20 MG/1
TABLET, FILM COATED ORAL
Qty: 90 TABLET | Refills: 0 | Status: SHIPPED | OUTPATIENT
Start: 2023-04-05

## 2023-04-05 NOTE — TELEPHONE ENCOUNTER
Lele Johnson is calling to request a refill on the following medication(s):    Medication Request:  Requested Prescriptions     Pending Prescriptions Disp Refills    atorvastatin (LIPITOR) 20 MG tablet [Pharmacy Med Name: ATORVASTATIN CALCIUM 20 MG Tablet] 90 tablet 0     Sig: TAKE 1 TABLET EVERY DAY       Last Visit Date (If Applicable):  7/69/1148    Next Visit Date:    5/30/2023

## 2023-05-30 ENCOUNTER — HOSPITAL ENCOUNTER (OUTPATIENT)
Age: 73
Setting detail: SPECIMEN
Discharge: HOME | End: 2023-05-30

## 2023-05-30 ENCOUNTER — OFFICE VISIT (OUTPATIENT)
Dept: INTERNAL MEDICINE CLINIC | Age: 73
End: 2023-05-30
Payer: MEDICARE

## 2023-05-30 VITALS
SYSTOLIC BLOOD PRESSURE: 126 MMHG | OXYGEN SATURATION: 99 % | HEIGHT: 68 IN | DIASTOLIC BLOOD PRESSURE: 68 MMHG | TEMPERATURE: 97.5 F | HEART RATE: 73 BPM | WEIGHT: 148 LBS | BODY MASS INDEX: 22.43 KG/M2 | RESPIRATION RATE: 20 BRPM

## 2023-05-30 DIAGNOSIS — N18.2 TYPE 2 DIABETES MELLITUS WITH STAGE 2 CHRONIC KIDNEY DISEASE, WITHOUT LONG-TERM CURRENT USE OF INSULIN (HCC): ICD-10-CM

## 2023-05-30 DIAGNOSIS — Z28.21 TETANUS, DIPHTHERIA, AND ACELLULAR PERTUSSIS (TDAP) VACCINATION DECLINED: ICD-10-CM

## 2023-05-30 DIAGNOSIS — D12.6 ADENOMATOUS POLYP OF COLON, UNSPECIFIED PART OF COLON: ICD-10-CM

## 2023-05-30 DIAGNOSIS — N28.9 RENAL INSUFFICIENCY: ICD-10-CM

## 2023-05-30 DIAGNOSIS — N13.8 BPH WITH OBSTRUCTION/LOWER URINARY TRACT SYMPTOMS: ICD-10-CM

## 2023-05-30 DIAGNOSIS — R74.8 ELEVATED LIVER ENZYMES: ICD-10-CM

## 2023-05-30 DIAGNOSIS — Z80.42 FAMILY HISTORY OF PROSTATE CANCER: ICD-10-CM

## 2023-05-30 DIAGNOSIS — Z78.9 DAILY CONSUMPTION OF ALCOHOL: ICD-10-CM

## 2023-05-30 DIAGNOSIS — Z90.49 S/P CHOLECYSTECTOMY: ICD-10-CM

## 2023-05-30 DIAGNOSIS — I10 ESSENTIAL HYPERTENSION: ICD-10-CM

## 2023-05-30 DIAGNOSIS — R97.20 ELEVATED PSA: ICD-10-CM

## 2023-05-30 DIAGNOSIS — E11.22 TYPE 2 DIABETES MELLITUS WITH STAGE 2 CHRONIC KIDNEY DISEASE, WITHOUT LONG-TERM CURRENT USE OF INSULIN (HCC): ICD-10-CM

## 2023-05-30 DIAGNOSIS — N40.1 BPH WITH OBSTRUCTION/LOWER URINARY TRACT SYMPTOMS: ICD-10-CM

## 2023-05-30 DIAGNOSIS — N13.8 BPH WITH URINARY OBSTRUCTION: ICD-10-CM

## 2023-05-30 DIAGNOSIS — N40.1 BPH WITH URINARY OBSTRUCTION: ICD-10-CM

## 2023-05-30 DIAGNOSIS — E78.5 DYSLIPIDEMIA: ICD-10-CM

## 2023-05-30 DIAGNOSIS — N52.01 ERECTILE DYSFUNCTION DUE TO ARTERIAL INSUFFICIENCY: ICD-10-CM

## 2023-05-30 DIAGNOSIS — E11.9 TYPE 2 DIABETES, DIET CONTROLLED (HCC): Primary | ICD-10-CM

## 2023-05-30 PROBLEM — N39.0 URINARY TRACT INFECTIOUS DISEASE: Status: RESOLVED | Noted: 2023-03-01 | Resolved: 2023-05-30

## 2023-05-30 PROBLEM — N21.0 BLADDER CALCULI: Status: RESOLVED | Noted: 2023-02-15 | Resolved: 2023-05-30

## 2023-05-30 PROCEDURE — 3078F DIAST BP <80 MM HG: CPT | Performed by: FAMILY MEDICINE

## 2023-05-30 PROCEDURE — 2022F DILAT RTA XM EVC RTNOPTHY: CPT | Performed by: FAMILY MEDICINE

## 2023-05-30 PROCEDURE — 1123F ACP DISCUSS/DSCN MKR DOCD: CPT | Performed by: FAMILY MEDICINE

## 2023-05-30 PROCEDURE — 99214 OFFICE O/P EST MOD 30 MIN: CPT | Performed by: FAMILY MEDICINE

## 2023-05-30 PROCEDURE — G8427 DOCREV CUR MEDS BY ELIG CLIN: HCPCS | Performed by: FAMILY MEDICINE

## 2023-05-30 PROCEDURE — G8420 CALC BMI NORM PARAMETERS: HCPCS | Performed by: FAMILY MEDICINE

## 2023-05-30 PROCEDURE — 3074F SYST BP LT 130 MM HG: CPT | Performed by: FAMILY MEDICINE

## 2023-05-30 PROCEDURE — 3046F HEMOGLOBIN A1C LEVEL >9.0%: CPT | Performed by: FAMILY MEDICINE

## 2023-05-30 PROCEDURE — 3017F COLORECTAL CA SCREEN DOC REV: CPT | Performed by: FAMILY MEDICINE

## 2023-05-30 PROCEDURE — 1036F TOBACCO NON-USER: CPT | Performed by: FAMILY MEDICINE

## 2023-05-30 NOTE — PROGRESS NOTES
with urinary obstruction        5. S/P cholecystectomy        6. Elevated liver enzymes  Comprehensive Metabolic Panel    Meryl Pinzon MD, Gastroenterology, [place]      7. Essential hypertension        8. Elevated PSA        9. Erectile dysfunction due to arterial insufficiency        10. Family history of prostate cancer        11. BPH with obstruction/lower urinary tract symptoms        12. Adenomatous polyp of colon, unspecified part of colon        13. Dyslipidemia        14. Daily consumption of alcohol        15. Renal insufficiency                Plan:      68-year-old -American male returns for follow-up. He is afebrile hemodynamically stable  Diabetes mellitus on metformin that he is tolerating well. A1c is 6.1. He denies hypoglycemia. Continue current regimen, ADA 1800 diet, daily moderate exercise  Hemodynamically stable with random blood pressure equal less than 130/80. Consume less than 2 g of salt a day  Hyperlipidemia on statin that he is tolerating well  Elevated liver enzymes. Patient states that since his last visit he stopped drinking wine. We will recheck his LFT. He is scheduled with GI  Elevated PSA with underlying BPH. He is established with urology. He denies any symptoms  History of polypectomy  History of laparoscopic cholecystectomy  GERD. Continue proton pump inhibitor  Further recommendations to follow labs and consultation with GI  Call for any concern  This note is created with a voice recognition program and while intend to generate a document that accurately reflects the content of the visit, no guarantee can be provided that every mistake has been identified and corrected by editing.           Aditya Borges MD

## 2023-05-31 LAB
ALBUMIN SERPL-MCNC: 4.4 G/DL (ref 3.5–5.2)
ALBUMIN/GLOB SERPL: 1.3 {RATIO} (ref 1–2.5)
ALP SERPL-CCNC: 103 U/L (ref 40–129)
ALT SERPL-CCNC: 18 U/L (ref 5–41)
ANION GAP SERPL CALCULATED.3IONS-SCNC: 20 MMOL/L (ref 9–17)
AST SERPL-CCNC: 17 U/L
BILIRUB SERPL-MCNC: 0.2 MG/DL (ref 0.3–1.2)
BUN SERPL-MCNC: 18 MG/DL (ref 8–23)
CALCIUM SERPL-MCNC: 9.7 MG/DL (ref 8.6–10.4)
CHLORIDE SERPL-SCNC: 103 MMOL/L (ref 98–107)
CO2 SERPL-SCNC: 20 MMOL/L (ref 20–31)
CREAT SERPL-MCNC: 1.06 MG/DL (ref 0.7–1.2)
GFR SERPL CREATININE-BSD FRML MDRD: >60 ML/MIN/1.73M2
GLUCOSE SERPL-MCNC: 103 MG/DL (ref 70–99)
POTASSIUM SERPL-SCNC: 4.5 MMOL/L (ref 3.7–5.3)
PROT SERPL-MCNC: 7.8 G/DL (ref 6.4–8.3)
SODIUM SERPL-SCNC: 143 MMOL/L (ref 135–144)

## 2023-06-15 PROBLEM — R33.8 OTHER RETENTION OF URINE: Status: ACTIVE | Noted: 2023-06-15

## 2023-06-15 PROBLEM — Z87.448 HISTORY OF BLADDER STONE: Status: ACTIVE | Noted: 2023-06-15

## 2023-08-07 DIAGNOSIS — I10 ESSENTIAL HYPERTENSION: ICD-10-CM

## 2023-08-08 RX ORDER — VALSARTAN AND HYDROCHLOROTHIAZIDE 160; 12.5 MG/1; MG/1
TABLET, FILM COATED ORAL
Qty: 90 TABLET | Refills: 0 | Status: SHIPPED | OUTPATIENT
Start: 2023-08-08

## 2023-08-08 NOTE — TELEPHONE ENCOUNTER
Paula Rebolledo is calling to request a refill on the following medication(s):    Medication Request:  Requested Prescriptions     Pending Prescriptions Disp Refills    valsartan-hydroCHLOROthiazide (DIOVAN-HCT) 160-12.5 MG per tablet [Pharmacy Med Name: VALSARTAN/HYDROCHLOROTHIAZIDE 160-12.5 MG Tablet] 90 tablet 0     Sig: TAKE 1 TABLET EVERY DAY       Last Visit Date (If Applicable):  5/91/4112    Next Visit Date:    9/29/2023

## 2023-08-30 RX ORDER — ATORVASTATIN CALCIUM 20 MG/1
TABLET, FILM COATED ORAL
Qty: 90 TABLET | Refills: 1 | Status: SHIPPED | OUTPATIENT
Start: 2023-08-30

## 2023-08-30 NOTE — TELEPHONE ENCOUNTER
Zelda Rosenberg is calling to request a refill on the following medication(s):    Medication Request:  Requested Prescriptions     Pending Prescriptions Disp Refills    atorvastatin (LIPITOR) 20 MG tablet [Pharmacy Med Name: ATORVASTATIN CALCIUM 20 MG Tablet] 90 tablet 0     Sig: TAKE 1 TABLET EVERY DAY       Last Visit Date (If Applicable):  8/35/5017    Next Visit Date:    9/29/2023        Last refill 4/5/23. Prescription pending.

## 2023-09-13 NOTE — TELEPHONE ENCOUNTER
Nicoletet Hanna is calling to request a refill on the following medication(s):    Medication Request:  Requested Prescriptions     Pending Prescriptions Disp Refills    metFORMIN (GLUCOPHAGE) 500 MG tablet [Pharmacy Med Name: METFORMIN HYDROCHLORIDE 500 MG Tablet] 180 tablet 0     Sig: TAKE 1 TABLET TWICE DAILY WITH MEALS       Last Visit Date (If Applicable):  2/53/1429    Next Visit Date:    9/29/2023      Last refill 4/15/23. Prescription pending.

## 2023-09-29 ENCOUNTER — OFFICE VISIT (OUTPATIENT)
Dept: INTERNAL MEDICINE CLINIC | Age: 73
End: 2023-09-29
Payer: MEDICARE

## 2023-09-29 DIAGNOSIS — N28.9 RENAL INSUFFICIENCY: ICD-10-CM

## 2023-09-29 DIAGNOSIS — N13.8 BPH WITH URINARY OBSTRUCTION: ICD-10-CM

## 2023-09-29 DIAGNOSIS — E11.9 TYPE 2 DIABETES, DIET CONTROLLED (HCC): ICD-10-CM

## 2023-09-29 DIAGNOSIS — N40.1 BPH WITH OBSTRUCTION/LOWER URINARY TRACT SYMPTOMS: ICD-10-CM

## 2023-09-29 DIAGNOSIS — N13.8 BPH WITH OBSTRUCTION/LOWER URINARY TRACT SYMPTOMS: ICD-10-CM

## 2023-09-29 DIAGNOSIS — E78.5 DYSLIPIDEMIA: ICD-10-CM

## 2023-09-29 DIAGNOSIS — N40.1 BPH WITH URINARY OBSTRUCTION: ICD-10-CM

## 2023-09-29 DIAGNOSIS — Z28.21 TETANUS, DIPHTHERIA, AND ACELLULAR PERTUSSIS (TDAP) VACCINATION DECLINED: Primary | ICD-10-CM

## 2023-09-29 DIAGNOSIS — Z90.49 S/P CHOLECYSTECTOMY: ICD-10-CM

## 2023-09-29 DIAGNOSIS — R97.20 ELEVATED PSA: ICD-10-CM

## 2023-09-29 DIAGNOSIS — Z87.448 HISTORY OF BLADDER STONE: ICD-10-CM

## 2023-09-29 DIAGNOSIS — D12.6 ADENOMATOUS POLYP OF COLON, UNSPECIFIED PART OF COLON: ICD-10-CM

## 2023-09-29 DIAGNOSIS — Z78.9 DAILY CONSUMPTION OF ALCOHOL: ICD-10-CM

## 2023-09-29 DIAGNOSIS — N52.01 ERECTILE DYSFUNCTION DUE TO ARTERIAL INSUFFICIENCY: ICD-10-CM

## 2023-09-29 DIAGNOSIS — Z80.42 FAMILY HISTORY OF PROSTATE CANCER: ICD-10-CM

## 2023-09-29 DIAGNOSIS — I10 ESSENTIAL HYPERTENSION: ICD-10-CM

## 2023-09-29 DIAGNOSIS — R74.8 ELEVATED LIVER ENZYMES: ICD-10-CM

## 2023-09-29 PROBLEM — E11.22 TYPE 2 DIABETES MELLITUS WITH STAGE 2 CHRONIC KIDNEY DISEASE, WITHOUT LONG-TERM CURRENT USE OF INSULIN (HCC): Status: RESOLVED | Noted: 2022-11-30 | Resolved: 2023-09-29

## 2023-09-29 PROBLEM — R33.8 OTHER RETENTION OF URINE: Status: RESOLVED | Noted: 2023-06-15 | Resolved: 2023-09-29

## 2023-09-29 PROBLEM — N18.2 TYPE 2 DIABETES MELLITUS WITH STAGE 2 CHRONIC KIDNEY DISEASE, WITHOUT LONG-TERM CURRENT USE OF INSULIN (HCC): Status: RESOLVED | Noted: 2022-11-30 | Resolved: 2023-09-29

## 2023-09-29 PROCEDURE — 3046F HEMOGLOBIN A1C LEVEL >9.0%: CPT | Performed by: FAMILY MEDICINE

## 2023-09-29 PROCEDURE — 99214 OFFICE O/P EST MOD 30 MIN: CPT | Performed by: FAMILY MEDICINE

## 2023-09-29 PROCEDURE — 2022F DILAT RTA XM EVC RTNOPTHY: CPT | Performed by: FAMILY MEDICINE

## 2023-09-29 PROCEDURE — G8428 CUR MEDS NOT DOCUMENT: HCPCS | Performed by: FAMILY MEDICINE

## 2023-09-29 PROCEDURE — 1036F TOBACCO NON-USER: CPT | Performed by: FAMILY MEDICINE

## 2023-09-29 PROCEDURE — G8420 CALC BMI NORM PARAMETERS: HCPCS | Performed by: FAMILY MEDICINE

## 2023-09-29 PROCEDURE — 3017F COLORECTAL CA SCREEN DOC REV: CPT | Performed by: FAMILY MEDICINE

## 2023-09-29 PROCEDURE — 1123F ACP DISCUSS/DSCN MKR DOCD: CPT | Performed by: FAMILY MEDICINE

## 2023-09-29 NOTE — PROGRESS NOTES
was verified, and a caregiver was present when appropriate. The patient was located at Home: [address] [address]  Provider was located at [facility] (Appt Dept): [address] [place] [address],[facility] [address]          Total time spent on this encounter:  25 minutes    --Jed Matias MD on 9/29/2023 at 10:17 AM    An electronic signature was used to authenticate this note. This note is created with a voice recognition program and while intend to generate a document that accurately reflects the content of the visit, no guarantee can be provided that every mistake has been identified and corrected by editing.

## 2023-10-02 ENCOUNTER — HOSPITAL ENCOUNTER (OUTPATIENT)
Age: 73
Setting detail: SPECIMEN
Discharge: HOME | End: 2023-10-02

## 2023-10-02 DIAGNOSIS — E11.9 TYPE 2 DIABETES, DIET CONTROLLED (HCC): ICD-10-CM

## 2023-10-02 DIAGNOSIS — R97.20 ELEVATED PSA: ICD-10-CM

## 2023-10-02 DIAGNOSIS — E78.5 DYSLIPIDEMIA: ICD-10-CM

## 2023-10-02 DIAGNOSIS — N28.9 RENAL INSUFFICIENCY: ICD-10-CM

## 2023-10-02 LAB
CHOLEST SERPL-MCNC: 95 MG/DL (ref 0–199)
CHOLESTEROL/HDL RATIO: 3
CREAT UR-MCNC: 119.8 MG/DL (ref 39–259)
EST. AVERAGE GLUCOSE BLD GHB EST-MCNC: 131 MG/DL
HBA1C MFR BLD: 6.2 % (ref 4–6)
HDLC SERPL-MCNC: 34 MG/DL
LDLC SERPL CALC-MCNC: 48 MG/DL (ref 0–100)
MICROALBUMIN UR-MCNC: 87 MG/L
MICROALBUMIN/CREAT UR-RTO: 73 MCG/MG CREAT
TRIGL SERPL-MCNC: 66 MG/DL (ref 0–149)
TSH SERPL DL<=0.05 MIU/L-ACNC: 1.65 UIU/ML (ref 0.27–4.2)
VLDLC SERPL CALC-MCNC: 13 MG/DL

## 2024-02-12 NOTE — TELEPHONE ENCOUNTER
Marbin Cordon is calling to request a refill on the following medication(s):    Medication Request:  Requested Prescriptions     Pending Prescriptions Disp Refills    metFORMIN (GLUCOPHAGE) 500 MG tablet [Pharmacy Med Name: METFORMIN HYDROCHLORIDE 500 MG Tablet] 180 tablet 3     Sig: TAKE 1 TABLET TWICE DAILY WITH MEALS       Last Visit Date (If Applicable):  9/29/2023    Next Visit Date:    Visit date not found      Last refill 9/13/23. Prescription pending.

## 2024-02-13 ENCOUNTER — HOSPITAL ENCOUNTER (OUTPATIENT)
Age: 74
Setting detail: SPECIMEN
Discharge: HOME | End: 2024-02-13

## 2024-02-13 ENCOUNTER — OFFICE VISIT (OUTPATIENT)
Dept: INTERNAL MEDICINE CLINIC | Age: 74
End: 2024-02-13
Payer: MEDICARE

## 2024-02-13 VITALS
BODY MASS INDEX: 23.19 KG/M2 | DIASTOLIC BLOOD PRESSURE: 78 MMHG | TEMPERATURE: 97.2 F | SYSTOLIC BLOOD PRESSURE: 122 MMHG | RESPIRATION RATE: 12 BRPM | WEIGHT: 153 LBS | HEIGHT: 68 IN | HEART RATE: 72 BPM

## 2024-02-13 DIAGNOSIS — Z80.42 FAMILY HISTORY OF PROSTATE CANCER: ICD-10-CM

## 2024-02-13 DIAGNOSIS — D12.6 ADENOMATOUS POLYP OF COLON, UNSPECIFIED PART OF COLON: ICD-10-CM

## 2024-02-13 DIAGNOSIS — Z12.11 COLON CANCER SCREENING: ICD-10-CM

## 2024-02-13 DIAGNOSIS — Z78.9 DAILY CONSUMPTION OF ALCOHOL: ICD-10-CM

## 2024-02-13 DIAGNOSIS — R97.20 ELEVATED PSA: ICD-10-CM

## 2024-02-13 DIAGNOSIS — N40.1 BPH WITH OBSTRUCTION/LOWER URINARY TRACT SYMPTOMS: ICD-10-CM

## 2024-02-13 DIAGNOSIS — E11.9 TYPE 2 DIABETES, DIET CONTROLLED (HCC): ICD-10-CM

## 2024-02-13 DIAGNOSIS — N52.01 ERECTILE DYSFUNCTION DUE TO ARTERIAL INSUFFICIENCY: ICD-10-CM

## 2024-02-13 DIAGNOSIS — N13.8 BPH WITH URINARY OBSTRUCTION: ICD-10-CM

## 2024-02-13 DIAGNOSIS — N13.8 BPH WITH OBSTRUCTION/LOWER URINARY TRACT SYMPTOMS: ICD-10-CM

## 2024-02-13 DIAGNOSIS — E78.5 DYSLIPIDEMIA: ICD-10-CM

## 2024-02-13 DIAGNOSIS — N28.9 RENAL INSUFFICIENCY: ICD-10-CM

## 2024-02-13 DIAGNOSIS — R74.8 ELEVATED LIVER ENZYMES: ICD-10-CM

## 2024-02-13 DIAGNOSIS — Z90.49 S/P CHOLECYSTECTOMY: ICD-10-CM

## 2024-02-13 DIAGNOSIS — N40.1 BPH WITH URINARY OBSTRUCTION: ICD-10-CM

## 2024-02-13 DIAGNOSIS — I10 ESSENTIAL HYPERTENSION: Primary | ICD-10-CM

## 2024-02-13 DIAGNOSIS — Z28.21 TETANUS, DIPHTHERIA, AND ACELLULAR PERTUSSIS (TDAP) VACCINATION DECLINED: ICD-10-CM

## 2024-02-13 PROBLEM — Z87.448 HISTORY OF BLADDER STONE: Status: RESOLVED | Noted: 2023-06-15 | Resolved: 2024-02-13

## 2024-02-13 LAB
ALBUMIN SERPL-MCNC: 4.4 G/DL (ref 3.5–5.2)
ALBUMIN/GLOB SERPL: 1 {RATIO} (ref 1–2.5)
ALP SERPL-CCNC: 88 U/L (ref 40–129)
ALT SERPL-CCNC: 29 U/L (ref 10–50)
ANION GAP SERPL CALCULATED.3IONS-SCNC: 12 MMOL/L (ref 9–16)
AST SERPL-CCNC: 25 U/L (ref 10–50)
BACTERIA URNS QL MICRO: ABNORMAL
BILIRUB SERPL-MCNC: 0.2 MG/DL (ref 0–1.2)
BILIRUB UR QL STRIP: NEGATIVE
BUN SERPL-MCNC: 26 MG/DL (ref 8–23)
CALCIUM SERPL-MCNC: 9.6 MG/DL (ref 8.6–10.4)
CASTS #/AREA URNS LPF: ABNORMAL /LPF (ref 0–8)
CHLORIDE SERPL-SCNC: 100 MMOL/L (ref 98–107)
CHOLEST SERPL-MCNC: 97 MG/DL (ref 0–199)
CLARITY UR: CLEAR
CO2 SERPL-SCNC: 25 MMOL/L (ref 20–31)
COLOR UR: YELLOW
CREAT SERPL-MCNC: 1.1 MG/DL (ref 0.7–1.2)
EPI CELLS #/AREA URNS HPF: ABNORMAL /HPF (ref 0–5)
ERYTHROCYTE [DISTWIDTH] IN BLOOD BY AUTOMATED COUNT: 14.9 % (ref 11.8–14.4)
EST. AVERAGE GLUCOSE BLD GHB EST-MCNC: 131 MG/DL
GFR SERPL CREATININE-BSD FRML MDRD: >60 ML/MIN/1.73M2
GLUCOSE SERPL-MCNC: 113 MG/DL (ref 74–99)
GLUCOSE UR STRIP-MCNC: NEGATIVE MG/DL
HBA1C MFR BLD: 6.2 % (ref 4–6)
HCT VFR BLD AUTO: 45.6 % (ref 40.7–50.3)
HDLC SERPL-MCNC: 37 MG/DL
HGB BLD-MCNC: 15.1 G/DL (ref 13–17)
HGB UR QL STRIP.AUTO: NEGATIVE
KETONES UR STRIP-MCNC: NEGATIVE MG/DL
LDLC SERPL CALC-MCNC: 50 MG/DL (ref 0–100)
LEUKOCYTE ESTERASE UR QL STRIP: ABNORMAL
MCH RBC QN AUTO: 29.5 PG (ref 25.2–33.5)
MCHC RBC AUTO-ENTMCNC: 33.1 G/DL (ref 28.4–34.8)
MCV RBC AUTO: 89.1 FL (ref 82.6–102.9)
NITRITE UR QL STRIP: NEGATIVE
NRBC BLD-RTO: 0 PER 100 WBC
PH UR STRIP: 5.5 [PH] (ref 5–8)
PLATELET # BLD AUTO: 155 K/UL (ref 138–453)
PMV BLD AUTO: 10.3 FL (ref 8.1–13.5)
POTASSIUM SERPL-SCNC: 4.8 MMOL/L (ref 3.7–5.3)
PROT SERPL-MCNC: 7.4 G/DL (ref 6.6–8.7)
PROT UR STRIP-MCNC: ABNORMAL MG/DL
RBC # BLD AUTO: 5.12 M/UL (ref 4.21–5.77)
RBC #/AREA URNS HPF: ABNORMAL /HPF (ref 0–4)
SODIUM SERPL-SCNC: 137 MMOL/L (ref 136–145)
SP GR UR STRIP: 1.02 (ref 1–1.03)
TRIGL SERPL-MCNC: 51 MG/DL
TSH SERPL DL<=0.05 MIU/L-ACNC: 1.32 UIU/ML (ref 0.27–4.2)
UROBILINOGEN UR STRIP-ACNC: NORMAL EU/DL (ref 0–1)
VLDLC SERPL CALC-MCNC: 10 MG/DL
WBC #/AREA URNS HPF: ABNORMAL /HPF (ref 0–5)
WBC OTHER # BLD: 4 K/UL (ref 3.5–11.3)

## 2024-02-13 PROCEDURE — 3078F DIAST BP <80 MM HG: CPT | Performed by: FAMILY MEDICINE

## 2024-02-13 PROCEDURE — 3046F HEMOGLOBIN A1C LEVEL >9.0%: CPT | Performed by: FAMILY MEDICINE

## 2024-02-13 PROCEDURE — G8484 FLU IMMUNIZE NO ADMIN: HCPCS | Performed by: FAMILY MEDICINE

## 2024-02-13 PROCEDURE — 3074F SYST BP LT 130 MM HG: CPT | Performed by: FAMILY MEDICINE

## 2024-02-13 PROCEDURE — 3017F COLORECTAL CA SCREEN DOC REV: CPT | Performed by: FAMILY MEDICINE

## 2024-02-13 PROCEDURE — G8427 DOCREV CUR MEDS BY ELIG CLIN: HCPCS | Performed by: FAMILY MEDICINE

## 2024-02-13 PROCEDURE — 1036F TOBACCO NON-USER: CPT | Performed by: FAMILY MEDICINE

## 2024-02-13 PROCEDURE — G8420 CALC BMI NORM PARAMETERS: HCPCS | Performed by: FAMILY MEDICINE

## 2024-02-13 PROCEDURE — 1123F ACP DISCUSS/DSCN MKR DOCD: CPT | Performed by: FAMILY MEDICINE

## 2024-02-13 PROCEDURE — 2022F DILAT RTA XM EVC RTNOPTHY: CPT | Performed by: FAMILY MEDICINE

## 2024-02-13 PROCEDURE — 99214 OFFICE O/P EST MOD 30 MIN: CPT | Performed by: FAMILY MEDICINE

## 2024-02-13 RX ORDER — ATORVASTATIN CALCIUM 20 MG/1
20 TABLET, FILM COATED ORAL DAILY
Qty: 90 TABLET | Refills: 1 | Status: SHIPPED | OUTPATIENT
Start: 2024-02-13

## 2024-02-13 RX ORDER — VALSARTAN AND HYDROCHLOROTHIAZIDE 160; 12.5 MG/1; MG/1
1 TABLET, FILM COATED ORAL DAILY
Qty: 90 TABLET | Refills: 1 | Status: SHIPPED | OUTPATIENT
Start: 2024-02-13

## 2024-02-28 NOTE — TELEPHONE ENCOUNTER
Marbin Cordon is calling to request a refill on the following medication(s):    Medication Request:  Requested Prescriptions     Pending Prescriptions Disp Refills    metFORMIN (GLUCOPHAGE) 500 MG tablet [Pharmacy Med Name: METFORMIN HYDROCHLORIDE 500 MG Tablet] 30 tablet 0     Sig: TAKE 1 TABLET TWICE DAILY WITH MEALS       Last Visit Date (If Applicable):  2/13/2024    Next Visit Date:    8/13/2024      Last refill 2/12/24. Prescription pending.

## 2024-08-08 DIAGNOSIS — I10 ESSENTIAL HYPERTENSION: ICD-10-CM

## 2024-08-08 RX ORDER — VALSARTAN AND HYDROCHLOROTHIAZIDE 160; 12.5 MG/1; MG/1
1 TABLET, FILM COATED ORAL DAILY
Qty: 30 TABLET | Refills: 0 | Status: SHIPPED | OUTPATIENT
Start: 2024-08-08

## 2024-08-08 NOTE — TELEPHONE ENCOUNTER
Marbin Cordon is calling to request a refill on the following medication(s):    Medication Request:  Requested Prescriptions     Pending Prescriptions Disp Refills    valsartan-hydroCHLOROthiazide (DIOVAN-HCT) 160-12.5 MG per tablet [Pharmacy Med Name: VALSARTAN/HYDROCHLOROTHIAZIDE 160-12.5 MG Tablet] 90 tablet 3     Sig: TAKE 1 TABLET EVERY DAY       Last Visit Date (If Applicable):  2/13/2024    Next Visit Date:    8/13/2024    Last refill 2/13/24. Prescription pending

## 2024-08-13 ENCOUNTER — OFFICE VISIT (OUTPATIENT)
Dept: FAMILY MEDICINE CLINIC | Age: 74
End: 2024-08-13
Payer: MEDICARE

## 2024-08-13 VITALS
WEIGHT: 150.8 LBS | RESPIRATION RATE: 16 BRPM | DIASTOLIC BLOOD PRESSURE: 70 MMHG | HEIGHT: 68 IN | TEMPERATURE: 97.3 F | BODY MASS INDEX: 22.85 KG/M2 | HEART RATE: 64 BPM | SYSTOLIC BLOOD PRESSURE: 138 MMHG

## 2024-08-13 DIAGNOSIS — E11.9 TYPE 2 DIABETES, DIET CONTROLLED (HCC): Primary | ICD-10-CM

## 2024-08-13 DIAGNOSIS — N52.01 ERECTILE DYSFUNCTION DUE TO ARTERIAL INSUFFICIENCY: ICD-10-CM

## 2024-08-13 DIAGNOSIS — N13.8 BPH WITH OBSTRUCTION/LOWER URINARY TRACT SYMPTOMS: ICD-10-CM

## 2024-08-13 DIAGNOSIS — Z78.9 DAILY CONSUMPTION OF ALCOHOL: ICD-10-CM

## 2024-08-13 DIAGNOSIS — N40.1 BPH WITH OBSTRUCTION/LOWER URINARY TRACT SYMPTOMS: ICD-10-CM

## 2024-08-13 DIAGNOSIS — D12.6 ADENOMATOUS POLYP OF COLON, UNSPECIFIED PART OF COLON: ICD-10-CM

## 2024-08-13 DIAGNOSIS — E78.5 DYSLIPIDEMIA: ICD-10-CM

## 2024-08-13 DIAGNOSIS — I10 ESSENTIAL HYPERTENSION: ICD-10-CM

## 2024-08-13 DIAGNOSIS — N28.9 RENAL INSUFFICIENCY: ICD-10-CM

## 2024-08-13 DIAGNOSIS — Z28.21 TETANUS, DIPHTHERIA, AND ACELLULAR PERTUSSIS (TDAP) VACCINATION DECLINED: ICD-10-CM

## 2024-08-13 PROBLEM — R74.8 ELEVATED LIVER ENZYMES: Status: RESOLVED | Noted: 2023-03-02 | Resolved: 2024-08-13

## 2024-08-13 PROCEDURE — 3075F SYST BP GE 130 - 139MM HG: CPT | Performed by: FAMILY MEDICINE

## 2024-08-13 PROCEDURE — 1036F TOBACCO NON-USER: CPT | Performed by: FAMILY MEDICINE

## 2024-08-13 PROCEDURE — 1123F ACP DISCUSS/DSCN MKR DOCD: CPT | Performed by: FAMILY MEDICINE

## 2024-08-13 PROCEDURE — 3078F DIAST BP <80 MM HG: CPT | Performed by: FAMILY MEDICINE

## 2024-08-13 PROCEDURE — G8427 DOCREV CUR MEDS BY ELIG CLIN: HCPCS | Performed by: FAMILY MEDICINE

## 2024-08-13 PROCEDURE — 3044F HG A1C LEVEL LT 7.0%: CPT | Performed by: FAMILY MEDICINE

## 2024-08-13 PROCEDURE — 99214 OFFICE O/P EST MOD 30 MIN: CPT | Performed by: FAMILY MEDICINE

## 2024-08-13 PROCEDURE — G8420 CALC BMI NORM PARAMETERS: HCPCS | Performed by: FAMILY MEDICINE

## 2024-08-13 SDOH — ECONOMIC STABILITY: FOOD INSECURITY: WITHIN THE PAST 12 MONTHS, YOU WORRIED THAT YOUR FOOD WOULD RUN OUT BEFORE YOU GOT MONEY TO BUY MORE.: NEVER TRUE

## 2024-08-13 SDOH — ECONOMIC STABILITY: FOOD INSECURITY: WITHIN THE PAST 12 MONTHS, THE FOOD YOU BOUGHT JUST DIDN'T LAST AND YOU DIDN'T HAVE MONEY TO GET MORE.: NEVER TRUE

## 2024-08-13 SDOH — ECONOMIC STABILITY: INCOME INSECURITY: HOW HARD IS IT FOR YOU TO PAY FOR THE VERY BASICS LIKE FOOD, HOUSING, MEDICAL CARE, AND HEATING?: NOT HARD AT ALL

## 2024-08-13 ASSESSMENT — ENCOUNTER SYMPTOMS
GASTROINTESTINAL NEGATIVE: 1
ALLERGIC/IMMUNOLOGIC NEGATIVE: 1
RESPIRATORY NEGATIVE: 1
EYES NEGATIVE: 1

## 2024-08-13 NOTE — PROGRESS NOTES
Subjective:      Patient ID: Marbin Cordon is a 74 y.o. male.    Hypertension  This is a chronic problem. The current episode started more than 1 year ago. The problem has been gradually improving since onset. The problem is controlled. Associated symptoms include anxiety. Risk factors for coronary artery disease include male gender, dyslipidemia and diabetes mellitus. Past treatments include angiotensin blockers and diuretics. The current treatment provides moderate improvement. There are no compliance problems.  Hypertensive end-organ damage includes kidney disease. Identifiable causes of hypertension include chronic renal disease.       Review of Systems   Constitutional: Negative.    HENT: Negative.     Eyes: Negative.    Respiratory: Negative.     Cardiovascular: Negative.    Gastrointestinal: Negative.    Endocrine: Negative.    Musculoskeletal:  Positive for arthralgias.   Skin: Negative.    Allergic/Immunologic: Negative.    Neurological: Negative.    Hematological: Negative.    Psychiatric/Behavioral: Negative.     Past family and social history unremarkable.  .iag      Objective:   Physical Exam  Vitals and nursing note reviewed.   Constitutional:       Appearance: He is well-developed.   HENT:      Head: Normocephalic and atraumatic.      Right Ear: External ear normal.      Left Ear: External ear normal.      Nose: Nose normal.   Eyes:      Conjunctiva/sclera: Conjunctivae normal.      Pupils: Pupils are equal, round, and reactive to light.   Cardiovascular:      Rate and Rhythm: Normal rate and regular rhythm.      Heart sounds: Normal heart sounds.      Comments: Diabetes  Hypertension  Hyperlipidemia  Pulmonary:      Effort: Pulmonary effort is normal.      Breath sounds: Normal breath sounds.   Abdominal:      General: Bowel sounds are normal.      Palpations: Abdomen is soft.      Comments: Daily alcohol consumption  Gradual normalization of LFTs   Genitourinary:     Comments: Renal

## 2024-09-06 DIAGNOSIS — I10 ESSENTIAL HYPERTENSION: ICD-10-CM

## 2024-09-06 NOTE — TELEPHONE ENCOUNTER
Marbin Cordon is calling to request a refill on the following medication(s):    Medication Request:  Requested Prescriptions     Pending Prescriptions Disp Refills    valsartan-hydroCHLOROthiazide (DIOVAN-HCT) 160-12.5 MG per tablet [Pharmacy Med Name: Valsartan-hydroCHLOROthiazide Oral Tablet 160-12.5 MG] 30 tablet 11     Sig: TAKE 1 TABLET EVERY DAY    atorvastatin (LIPITOR) 20 MG tablet [Pharmacy Med Name: Atorvastatin Calcium Oral Tablet 20 MG] 90 tablet 3     Sig: TAKE 1 TABLET EVERY DAY       Last Visit Date (If Applicable):  8/13/2024    Next Visit Date:    2/11/2025    Last refilled on 2/13 & 8/8/24. Prescription pending.

## 2024-09-07 RX ORDER — VALSARTAN AND HYDROCHLOROTHIAZIDE 160; 12.5 MG/1; MG/1
1 TABLET, FILM COATED ORAL DAILY
Qty: 90 TABLET | Refills: 1 | Status: SHIPPED | OUTPATIENT
Start: 2024-09-07

## 2024-09-07 RX ORDER — ATORVASTATIN CALCIUM 20 MG/1
20 TABLET, FILM COATED ORAL DAILY
Qty: 90 TABLET | Refills: 1 | Status: SHIPPED | OUTPATIENT
Start: 2024-09-07

## 2025-02-07 DIAGNOSIS — I10 ESSENTIAL HYPERTENSION: ICD-10-CM

## 2025-02-07 NOTE — TELEPHONE ENCOUNTER
Marbin Cordon is calling to request a refill on the following medication(s):    Medication Request:  Requested Prescriptions     Pending Prescriptions Disp Refills    valsartan-hydroCHLOROthiazide (DIOVAN-HCT) 160-12.5 MG per tablet [Pharmacy Med Name: Valsartan-hydroCHLOROthiazide Oral Tablet 160-12.5 MG] 90 tablet 3     Sig: TAKE 1 TABLET EVERY DAY    atorvastatin (LIPITOR) 20 MG tablet [Pharmacy Med Name: Atorvastatin Calcium Oral Tablet 20 MG] 90 tablet 3     Sig: TAKE 1 TABLET EVERY DAY       Last Visit Date (If Applicable):  8/13/2024    Next Visit Date:    2/11/2025

## 2025-02-08 RX ORDER — ATORVASTATIN CALCIUM 20 MG/1
20 TABLET, FILM COATED ORAL DAILY
Qty: 90 TABLET | Refills: 0 | Status: SHIPPED | OUTPATIENT
Start: 2025-02-08

## 2025-02-08 RX ORDER — VALSARTAN AND HYDROCHLOROTHIAZIDE 160; 12.5 MG/1; MG/1
1 TABLET, FILM COATED ORAL DAILY
Qty: 90 TABLET | Refills: 0 | Status: SHIPPED | OUTPATIENT
Start: 2025-02-08

## 2025-02-11 ENCOUNTER — OFFICE VISIT (OUTPATIENT)
Dept: FAMILY MEDICINE CLINIC | Age: 75
End: 2025-02-11

## 2025-02-11 ENCOUNTER — HOSPITAL ENCOUNTER (OUTPATIENT)
Age: 75
Setting detail: SPECIMEN
Discharge: HOME | End: 2025-02-11

## 2025-02-11 VITALS
BODY MASS INDEX: 23.57 KG/M2 | SYSTOLIC BLOOD PRESSURE: 138 MMHG | DIASTOLIC BLOOD PRESSURE: 84 MMHG | HEART RATE: 69 BPM | TEMPERATURE: 97.9 F | OXYGEN SATURATION: 99 % | WEIGHT: 155 LBS

## 2025-02-11 DIAGNOSIS — N52.01 ERECTILE DYSFUNCTION DUE TO ARTERIAL INSUFFICIENCY: ICD-10-CM

## 2025-02-11 DIAGNOSIS — D12.6 ADENOMATOUS POLYP OF COLON, UNSPECIFIED PART OF COLON: ICD-10-CM

## 2025-02-11 DIAGNOSIS — Z12.5 ENCOUNTER FOR PROSTATE CANCER SCREENING: ICD-10-CM

## 2025-02-11 DIAGNOSIS — Z28.21 TETANUS, DIPHTHERIA, AND ACELLULAR PERTUSSIS (TDAP) VACCINATION DECLINED: ICD-10-CM

## 2025-02-11 DIAGNOSIS — E78.5 DYSLIPIDEMIA: ICD-10-CM

## 2025-02-11 DIAGNOSIS — N40.1 BPH WITH OBSTRUCTION/LOWER URINARY TRACT SYMPTOMS: ICD-10-CM

## 2025-02-11 DIAGNOSIS — Z90.49 S/P CHOLECYSTECTOMY: ICD-10-CM

## 2025-02-11 DIAGNOSIS — E11.9 TYPE 2 DIABETES, DIET CONTROLLED (HCC): ICD-10-CM

## 2025-02-11 DIAGNOSIS — N28.9 RENAL INSUFFICIENCY: ICD-10-CM

## 2025-02-11 DIAGNOSIS — N13.8 BPH WITH OBSTRUCTION/LOWER URINARY TRACT SYMPTOMS: ICD-10-CM

## 2025-02-11 DIAGNOSIS — Z78.9 DAILY CONSUMPTION OF ALCOHOL: ICD-10-CM

## 2025-02-11 DIAGNOSIS — Z00.00 MEDICARE ANNUAL WELLNESS VISIT, SUBSEQUENT: Primary | ICD-10-CM

## 2025-02-11 DIAGNOSIS — I10 ESSENTIAL HYPERTENSION: ICD-10-CM

## 2025-02-11 LAB
ALBUMIN SERPL-MCNC: 4.4 G/DL (ref 3.5–5.2)
ALBUMIN/GLOB SERPL: 1.4 {RATIO} (ref 1–2.5)
ALP SERPL-CCNC: 88 U/L (ref 40–129)
ALT SERPL-CCNC: 37 U/L (ref 10–50)
ANION GAP SERPL CALCULATED.3IONS-SCNC: 9 MMOL/L (ref 9–16)
AST SERPL-CCNC: 27 U/L (ref 10–50)
BILIRUB SERPL-MCNC: 0.3 MG/DL (ref 0–1.2)
BILIRUB UR QL STRIP: NEGATIVE
BUN SERPL-MCNC: 19 MG/DL (ref 8–23)
CALCIUM SERPL-MCNC: 9.4 MG/DL (ref 8.6–10.4)
CHLORIDE SERPL-SCNC: 103 MMOL/L (ref 98–107)
CHOLEST SERPL-MCNC: 139 MG/DL (ref 0–199)
CHOLESTEROL/HDL RATIO: 3.9
CLARITY UR: CLEAR
CO2 SERPL-SCNC: 27 MMOL/L (ref 20–31)
COLOR UR: YELLOW
CREAT SERPL-MCNC: 1.1 MG/DL (ref 0.7–1.2)
EPI CELLS #/AREA URNS HPF: NORMAL /HPF (ref 0–5)
ERYTHROCYTE [DISTWIDTH] IN BLOOD BY AUTOMATED COUNT: 15.1 % (ref 11.8–14.4)
EST. AVERAGE GLUCOSE BLD GHB EST-MCNC: 131 MG/DL
GFR, ESTIMATED: 70 ML/MIN/1.73M2
GLUCOSE SERPL-MCNC: 107 MG/DL (ref 74–99)
GLUCOSE UR STRIP-MCNC: NEGATIVE MG/DL
HBA1C MFR BLD: 6.2 % (ref 4–6)
HCT VFR BLD AUTO: 48.2 % (ref 40.7–50.3)
HDLC SERPL-MCNC: 36 MG/DL
HGB BLD-MCNC: 15.5 G/DL (ref 13–17)
HGB UR QL STRIP.AUTO: NEGATIVE
KETONES UR STRIP-MCNC: NEGATIVE MG/DL
LDLC SERPL CALC-MCNC: 84 MG/DL (ref 0–100)
LEUKOCYTE ESTERASE UR QL STRIP: ABNORMAL
MAGNESIUM SERPL-MCNC: 2.1 MG/DL (ref 1.6–2.4)
MCH RBC QN AUTO: 29 PG (ref 25.2–33.5)
MCHC RBC AUTO-ENTMCNC: 32.2 G/DL (ref 28.4–34.8)
MCV RBC AUTO: 90.3 FL (ref 82.6–102.9)
NITRITE UR QL STRIP: NEGATIVE
NRBC BLD-RTO: 0 PER 100 WBC
PH UR STRIP: 6.5 [PH] (ref 5–8)
PLATELET # BLD AUTO: 167 K/UL (ref 138–453)
PMV BLD AUTO: 10.5 FL (ref 8.1–13.5)
POTASSIUM SERPL-SCNC: 4.4 MMOL/L (ref 3.7–5.3)
PROT SERPL-MCNC: 7.6 G/DL (ref 6.6–8.7)
PROT UR STRIP-MCNC: ABNORMAL MG/DL
PSA SERPL-MCNC: 1.12 NG/ML (ref 0–4)
RBC # BLD AUTO: 5.34 M/UL (ref 4.21–5.77)
RBC #/AREA URNS HPF: NORMAL /HPF (ref 0–2)
SODIUM SERPL-SCNC: 139 MMOL/L (ref 136–145)
SP GR UR STRIP: 1.02 (ref 1–1.03)
TRIGL SERPL-MCNC: 95 MG/DL
TSH SERPL DL<=0.05 MIU/L-ACNC: 2.08 UIU/ML (ref 0.27–4.2)
UROBILINOGEN UR STRIP-ACNC: NORMAL EU/DL (ref 0–1)
VLDLC SERPL CALC-MCNC: 19 MG/DL (ref 1–30)
WBC #/AREA URNS HPF: NORMAL /HPF (ref 0–5)
WBC OTHER # BLD: 3.6 K/UL (ref 3.5–11.3)

## 2025-02-11 ASSESSMENT — PATIENT HEALTH QUESTIONNAIRE - PHQ9
SUM OF ALL RESPONSES TO PHQ QUESTIONS 1-9: 0
2. FEELING DOWN, DEPRESSED OR HOPELESS: NOT AT ALL
1. LITTLE INTEREST OR PLEASURE IN DOING THINGS: NOT AT ALL
SUM OF ALL RESPONSES TO PHQ QUESTIONS 1-9: 0
SUM OF ALL RESPONSES TO PHQ9 QUESTIONS 1 & 2: 0

## 2025-02-11 ASSESSMENT — ENCOUNTER SYMPTOMS
ALLERGIC/IMMUNOLOGIC NEGATIVE: 1
EYES NEGATIVE: 1
BACK PAIN: 1
GASTROINTESTINAL NEGATIVE: 1
RESPIRATORY NEGATIVE: 1

## 2025-02-11 ASSESSMENT — LIFESTYLE VARIABLES
HOW OFTEN DO YOU HAVE A DRINK CONTAINING ALCOHOL: 2-3 TIMES A WEEK
HOW MANY STANDARD DRINKS CONTAINING ALCOHOL DO YOU HAVE ON A TYPICAL DAY: 1 OR 2

## 2025-02-11 NOTE — PROGRESS NOTES
Subjective:      Patient ID: Marbin Cordon is a 74 y.o. male.    74-year-old -American male is presented requesting Medicare annual exam.  He does not voice any distress        Review of Systems   Constitutional: Negative.    HENT: Negative.     Eyes: Negative.    Respiratory: Negative.     Cardiovascular: Negative.    Gastrointestinal: Negative.    Endocrine: Negative.    Musculoskeletal:  Positive for arthralgias and back pain.   Skin: Negative.    Allergic/Immunologic: Negative.    Neurological: Negative.    Hematological: Negative.    Psychiatric/Behavioral:  Positive for decreased concentration.    Past family and social history unremarkable.  .iag      Objective:   Physical Exam  Vitals and nursing note reviewed.   Constitutional:       Appearance: He is well-developed.   HENT:      Head: Normocephalic and atraumatic.      Right Ear: External ear normal.      Left Ear: External ear normal.      Nose: Nose normal.   Eyes:      Conjunctiva/sclera: Conjunctivae normal.      Pupils: Pupils are equal, round, and reactive to light.   Cardiovascular:      Rate and Rhythm: Normal rate and regular rhythm.      Heart sounds: Normal heart sounds.      Comments: Hypertension  Impaired fasting glucose  Dyslipidemia  Pulmonary:      Effort: Pulmonary effort is normal.      Breath sounds: Normal breath sounds.   Abdominal:      General: Bowel sounds are normal.      Palpations: Abdomen is soft.      Comments: Colon polyp   Genitourinary:     Comments: Erectile dysfunction  Renal insufficiency  Musculoskeletal:         General: Normal range of motion.      Cervical back: Normal range of motion and neck supple.      Comments: Degenerative spondylosis without any sign of myelopathy or reproducibility on palpation   Skin:     General: Skin is warm and dry.   Neurological:      Mental Status: He is alert and oriented to person, place, and time.      Deep Tendon Reflexes: Reflexes are normal and symmetric.

## 2025-04-25 DIAGNOSIS — I10 ESSENTIAL HYPERTENSION: ICD-10-CM

## 2025-04-25 NOTE — TELEPHONE ENCOUNTER
Marbin Cordon is calling to request a refill on the following medication(s):    Medication Request:  Requested Prescriptions     Pending Prescriptions Disp Refills    valsartan-hydroCHLOROthiazide (DIOVAN-HCT) 160-12.5 MG per tablet [Pharmacy Med Name: Valsartan-hydroCHLOROthiazide Oral Tablet 160-12.5 MG] 90 tablet 3     Sig: TAKE 1 TABLET EVERY DAY    atorvastatin (LIPITOR) 20 MG tablet [Pharmacy Med Name: Atorvastatin Calcium Oral Tablet 20 MG] 90 tablet 3     Sig: TAKE 1 TABLET EVERY DAY       Last Visit Date (If Applicable):  2/11/2025    Next Visit Date:    8/12/2025    Last refilled on 2/8/25. Prescription pending.

## 2025-04-26 RX ORDER — ATORVASTATIN CALCIUM 20 MG/1
20 TABLET, FILM COATED ORAL DAILY
Qty: 90 TABLET | Refills: 1 | Status: SHIPPED | OUTPATIENT
Start: 2025-04-26

## 2025-04-26 RX ORDER — VALSARTAN AND HYDROCHLOROTHIAZIDE 160; 12.5 MG/1; MG/1
1 TABLET, FILM COATED ORAL DAILY
Qty: 90 TABLET | Refills: 1 | Status: SHIPPED | OUTPATIENT
Start: 2025-04-26

## 2025-08-12 ENCOUNTER — OFFICE VISIT (OUTPATIENT)
Dept: FAMILY MEDICINE CLINIC | Age: 75
End: 2025-08-12
Payer: MEDICARE

## 2025-08-12 ENCOUNTER — HOSPITAL ENCOUNTER (OUTPATIENT)
Age: 75
Setting detail: SPECIMEN
Discharge: HOME | End: 2025-08-12

## 2025-08-12 VITALS
TEMPERATURE: 97.7 F | HEIGHT: 68 IN | SYSTOLIC BLOOD PRESSURE: 118 MMHG | WEIGHT: 146 LBS | RESPIRATION RATE: 12 BRPM | BODY MASS INDEX: 22.13 KG/M2 | OXYGEN SATURATION: 100 % | DIASTOLIC BLOOD PRESSURE: 76 MMHG | HEART RATE: 68 BPM

## 2025-08-12 DIAGNOSIS — Z28.21 VACCINATION DECLINED: ICD-10-CM

## 2025-08-12 DIAGNOSIS — N52.01 ERECTILE DYSFUNCTION DUE TO ARTERIAL INSUFFICIENCY: ICD-10-CM

## 2025-08-12 DIAGNOSIS — D12.6 ADENOMATOUS POLYP OF COLON, UNSPECIFIED PART OF COLON: ICD-10-CM

## 2025-08-12 DIAGNOSIS — N28.9 RENAL INSUFFICIENCY: ICD-10-CM

## 2025-08-12 DIAGNOSIS — I10 ESSENTIAL HYPERTENSION: ICD-10-CM

## 2025-08-12 DIAGNOSIS — E11.9 TYPE 2 DIABETES, DIET CONTROLLED (HCC): ICD-10-CM

## 2025-08-12 DIAGNOSIS — Z90.49 S/P CHOLECYSTECTOMY: ICD-10-CM

## 2025-08-12 DIAGNOSIS — Z78.9 DAILY CONSUMPTION OF ALCOHOL: ICD-10-CM

## 2025-08-12 DIAGNOSIS — E11.9 TYPE 2 DIABETES, DIET CONTROLLED (HCC): Primary | ICD-10-CM

## 2025-08-12 DIAGNOSIS — N13.8 BPH WITH OBSTRUCTION/LOWER URINARY TRACT SYMPTOMS: ICD-10-CM

## 2025-08-12 DIAGNOSIS — N40.1 BPH WITH OBSTRUCTION/LOWER URINARY TRACT SYMPTOMS: ICD-10-CM

## 2025-08-12 DIAGNOSIS — E78.5 DYSLIPIDEMIA: ICD-10-CM

## 2025-08-12 LAB
CREAT UR-MCNC: 114 MG/DL (ref 39–259)
MICROALBUMIN UR-MCNC: 76 MG/L (ref 0–20)
MICROALBUMIN/CREAT UR-RTO: 67 MCG/MG CREAT (ref 0–17)

## 2025-08-12 PROCEDURE — 1123F ACP DISCUSS/DSCN MKR DOCD: CPT | Performed by: FAMILY MEDICINE

## 2025-08-12 PROCEDURE — 1160F RVW MEDS BY RX/DR IN RCRD: CPT | Performed by: FAMILY MEDICINE

## 2025-08-12 PROCEDURE — 3078F DIAST BP <80 MM HG: CPT | Performed by: FAMILY MEDICINE

## 2025-08-12 PROCEDURE — 3074F SYST BP LT 130 MM HG: CPT | Performed by: FAMILY MEDICINE

## 2025-08-12 PROCEDURE — G8427 DOCREV CUR MEDS BY ELIG CLIN: HCPCS | Performed by: FAMILY MEDICINE

## 2025-08-12 PROCEDURE — G8420 CALC BMI NORM PARAMETERS: HCPCS | Performed by: FAMILY MEDICINE

## 2025-08-12 PROCEDURE — 3044F HG A1C LEVEL LT 7.0%: CPT | Performed by: FAMILY MEDICINE

## 2025-08-12 PROCEDURE — 1159F MED LIST DOCD IN RCRD: CPT | Performed by: FAMILY MEDICINE

## 2025-08-12 PROCEDURE — 99214 OFFICE O/P EST MOD 30 MIN: CPT | Performed by: FAMILY MEDICINE

## 2025-08-12 PROCEDURE — 1036F TOBACCO NON-USER: CPT | Performed by: FAMILY MEDICINE

## 2025-08-12 SDOH — ECONOMIC STABILITY: FOOD INSECURITY: WITHIN THE PAST 12 MONTHS, THE FOOD YOU BOUGHT JUST DIDN'T LAST AND YOU DIDN'T HAVE MONEY TO GET MORE.: NEVER TRUE

## 2025-08-12 SDOH — ECONOMIC STABILITY: FOOD INSECURITY: WITHIN THE PAST 12 MONTHS, YOU WORRIED THAT YOUR FOOD WOULD RUN OUT BEFORE YOU GOT MONEY TO BUY MORE.: NEVER TRUE

## 2025-08-12 ASSESSMENT — ENCOUNTER SYMPTOMS
EYES NEGATIVE: 1
RESPIRATORY NEGATIVE: 1
GASTROINTESTINAL NEGATIVE: 1
ALLERGIC/IMMUNOLOGIC NEGATIVE: 1

## 2025-08-18 ENCOUNTER — HOSPITAL ENCOUNTER (EMERGENCY)
Age: 75
Discharge: HOME | End: 2025-08-18
Attending: EMERGENCY MEDICINE
Payer: MEDICARE

## 2025-08-18 ENCOUNTER — APPOINTMENT (OUTPATIENT)
Dept: GENERAL RADIOLOGY | Age: 75
End: 2025-08-18
Payer: MEDICARE

## 2025-08-18 VITALS
HEART RATE: 74 BPM | OXYGEN SATURATION: 96 % | RESPIRATION RATE: 16 BRPM | HEIGHT: 68 IN | SYSTOLIC BLOOD PRESSURE: 165 MMHG | DIASTOLIC BLOOD PRESSURE: 71 MMHG | TEMPERATURE: 98.4 F | WEIGHT: 148 LBS | BODY MASS INDEX: 22.43 KG/M2

## 2025-08-18 DIAGNOSIS — M25.561 RIGHT KNEE PAIN, UNSPECIFIED CHRONICITY: Primary | ICD-10-CM

## 2025-08-18 PROCEDURE — 99283 EMERGENCY DEPT VISIT LOW MDM: CPT

## 2025-08-18 PROCEDURE — 73562 X-RAY EXAM OF KNEE 3: CPT

## 2025-08-18 RX ORDER — METHYLPREDNISOLONE 4 MG/1
TABLET ORAL
Qty: 1 KIT | Refills: 0 | Status: SHIPPED | OUTPATIENT
Start: 2025-08-18 | End: 2025-08-24

## 2025-08-18 ASSESSMENT — LIFESTYLE VARIABLES
HOW OFTEN DO YOU HAVE A DRINK CONTAINING ALCOHOL: MONTHLY OR LESS
HOW MANY STANDARD DRINKS CONTAINING ALCOHOL DO YOU HAVE ON A TYPICAL DAY: 1 OR 2

## 2025-08-19 ENCOUNTER — TELEPHONE (OUTPATIENT)
Dept: FAMILY MEDICINE CLINIC | Age: 75
End: 2025-08-19

## 2025-08-19 DIAGNOSIS — M25.561 ACUTE PAIN OF RIGHT KNEE: Primary | ICD-10-CM

## 2025-08-20 RX ORDER — LIDOCAINE 50 MG/G
1 PATCH TOPICAL DAILY
Qty: 10 PATCH | Refills: 0 | Status: SHIPPED | OUTPATIENT
Start: 2025-08-20 | End: 2025-08-30

## (undated) DEVICE — AIRSEAL 12 MM ACCESS PORT AND PALM GRIP OBTURATOR WITH BLADELESS OPTICAL TIP, 120 MM LENGTH: Brand: AIRSEAL

## (undated) DEVICE — Device

## (undated) DEVICE — SUTURE ETHLN SZ 3-0 L18IN NONABSORBABLE BLK L24MM PS-1 3/8 1663G

## (undated) DEVICE — SOLUTION SCRB 4OZ 4% CHG H2O AIDED FOR PREOPERATIVE SKIN

## (undated) DEVICE — BLADE CLIPPER GEN PURP NS

## (undated) DEVICE — CANNULA SEAL

## (undated) DEVICE — SYRINGE 20ML LL S/C 50

## (undated) DEVICE — TIP COVER ACCESSORY

## (undated) DEVICE — SUTURE MCRYL + SZ 4-0 L18IN ABSRB UD L19MM PS-2 3/8 CIR MCP496G

## (undated) DEVICE — SUTURE V-LOC 180 SZ 3-0 L6IN ABSRB GRN V-20 L26MM 1/2 CIR VLOCL0604

## (undated) DEVICE — SUTURE ABSORBABLE MONOFILAMENT 3-0 CV-23 12 IN GRN V-LOC 180 VLOCL0814

## (undated) DEVICE — PREMIUM DRY TRAY LF: Brand: MEDLINE INDUSTRIES, INC.

## (undated) DEVICE — SUTURE V-LOC 180 SZ 0 L9IN ABSRB GRN GS-21 L37MM 1/2 CIR VLOCL0346

## (undated) DEVICE — INSUFFLATION NEEDLE TO ESTABLISH PNEUMOPERITONEUM.: Brand: INSUFFLATION NEEDLE

## (undated) DEVICE — APPLICATOR MEDICATED 26 CC SOLUTION HI LT ORNG CHLORAPREP

## (undated) DEVICE — ELECTRODE PT RET AD L9FT HI MOIST COND ADH HYDRGEL CORDED

## (undated) DEVICE — COUNTER NDL 40 COUNT HLD 70 FOAM BLK ADH W/ MAG

## (undated) DEVICE — SOLUTION ANTIFOG VIS SYS CLEARIFY LAPSCP

## (undated) DEVICE — TROCAR: Brand: KII FIOS FIRST ENTRY

## (undated) DEVICE — DRAIN SURG 15FR SIL RND CHN W/ TRCR FULL FLUT DBL WRP TRAD

## (undated) DEVICE — TRI-LUMEN FILTERED TUBE SET WITH ACTIVATED CHARCOAL FILTER: Brand: AIRSEAL

## (undated) DEVICE — ARM DRAPE

## (undated) DEVICE — SCISSORS ENDOSCP LNG L45CM DIA5MM GRY SHFT OPN FOR 8MM CRV

## (undated) DEVICE — SUTURE ABSRB L12IN L12MM SZ 2-0 GS-22 VLT GLYCOLIDE VLOCM2115

## (undated) DEVICE — GLOVE ORANGE PI 8   MSG9080

## (undated) DEVICE — SUTURE VCRL SZ 0 L18IN ABSRB VLT L36MM CT-1 1/2 CIR J740D

## (undated) DEVICE — CONTAINER,SPECIMEN,4OZ,OR STRL: Brand: MEDLINE

## (undated) DEVICE — METER,URINE,400ML,DRAIN BAG,L/F,LL: Brand: MEDLINE

## (undated) DEVICE — SYRINGE MED 30ML STD CLR PLAS LUERLOCK TIP N CTRL DISP

## (undated) DEVICE — INTENDED FOR TISSUE SEPARATION, AND OTHER PROCEDURES THAT REQUIRE A SHARP SURGICAL BLADE TO PUNCTURE OR CUT.: Brand: BARD-PARKER ® CARBON RIB-BACK BLADES

## (undated) DEVICE — SUTURE V-LOC 180 SZ 3-0 L9IN ABSRB GRN L26MM V-20 1/2 CIR VLOCL0644

## (undated) DEVICE — ADHESIVE SKIN CLOSURE TOP 36 CC HI VISC DERMBND MINI

## (undated) DEVICE — SUTURE V-LOC 180 SZ 3-0 L6IN ABSRB GRN L17MM CV-23 1/2 CIR VLOCL0804

## (undated) DEVICE — RESERVOIR,SUCTION,100CC,SILICONE: Brand: MEDLINE

## (undated) DEVICE — ELECTRO LUBE IS A SINGLE PATIENT USE DEVICE THAT IS INTENDED TO BE USED ON ELECTROSURGICAL ELECTRODES TO REDUCE STICKING.: Brand: KEY SURGICAL ELECTRO LUBE

## (undated) DEVICE — CATHETER URETH 20FR BLLN 30CC 3 W F SPEC INF CTRL BARDX

## (undated) DEVICE — STRAP,CATHETER,ELASTIC,HOOK&LOOP: Brand: MEDLINE

## (undated) DEVICE — PAD PT POS 36 IN SURGYPAD DISP

## (undated) DEVICE — SYRINGE,PISTON,IRRIGATION,60ML,STERILE: Brand: MEDLINE

## (undated) DEVICE — BLADELESS OBTURATOR: Brand: WECK VISTA

## (undated) DEVICE — SUTURE ETHLN SZ 3-0 L30IN NONABSORBABLE BLK L60MM KS STR 627H